# Patient Record
Sex: FEMALE | Race: WHITE | Employment: UNEMPLOYED | URBAN - METROPOLITAN AREA
[De-identification: names, ages, dates, MRNs, and addresses within clinical notes are randomized per-mention and may not be internally consistent; named-entity substitution may affect disease eponyms.]

---

## 2021-10-06 ENCOUNTER — APPOINTMENT (OUTPATIENT)
Dept: CT IMAGING | Age: 21
End: 2021-10-06
Payer: COMMERCIAL

## 2021-10-06 ENCOUNTER — HOSPITAL ENCOUNTER (EMERGENCY)
Age: 21
Discharge: HOME | End: 2021-10-06
Attending: STUDENT IN AN ORGANIZED HEALTH CARE EDUCATION/TRAINING PROGRAM
Payer: COMMERCIAL

## 2021-10-06 VITALS
DIASTOLIC BLOOD PRESSURE: 81 MMHG | OXYGEN SATURATION: 98 % | SYSTOLIC BLOOD PRESSURE: 128 MMHG | HEART RATE: 99 BPM | TEMPERATURE: 98.1 F | WEIGHT: 110 LBS | HEIGHT: 64 IN | RESPIRATION RATE: 18 BRPM | BODY MASS INDEX: 18.78 KG/M2

## 2021-10-06 DIAGNOSIS — N12 PYELONEPHRITIS: Primary | ICD-10-CM

## 2021-10-06 DIAGNOSIS — B96.89 BACTERIAL VAGINOSIS: ICD-10-CM

## 2021-10-06 DIAGNOSIS — N76.0 BACTERIAL VAGINOSIS: ICD-10-CM

## 2021-10-06 LAB
BACTERIA: ABNORMAL /HPF
BILIRUBIN URINE: NEGATIVE MG/DL
BLOOD, URINE: ABNORMAL
CLARITY: ABNORMAL
COLOR: YELLOW
GLUCOSE, URINE: NEGATIVE MG/DL
INTERPRETATION: NORMAL
KETONES, URINE: NEGATIVE MG/DL
LEUKOCYTE ESTERASE, URINE: ABNORMAL
Lab: ABNORMAL
MUCUS: ABNORMAL HPF
NITRITE URINE, QUANTITATIVE: NEGATIVE
PH, URINE: 6 (ref 5–8)
PREGNANCY, URINE: NEGATIVE
PROTEIN UA: 30 MG/DL
RBC URINE: 46 /HPF (ref 0–6)
SPECIFIC GRAVITY UA: 1.01 (ref 1–1.03)
SPECIFIC GRAVITY, URINE: 1.01 (ref 1–1.03)
SPECIMEN: ABNORMAL
SQUAMOUS EPITHELIAL: 5 /HPF
TRICHOMONAS: ABNORMAL /HPF
UROBILINOGEN, URINE: NEGATIVE MG/DL (ref 0.2–1)
WBC UA: 97 /HPF (ref 0–5)
WET PREP: ABNORMAL

## 2021-10-06 PROCEDURE — 6360000002 HC RX W HCPCS: Performed by: STUDENT IN AN ORGANIZED HEALTH CARE EDUCATION/TRAINING PROGRAM

## 2021-10-06 PROCEDURE — 87491 CHLMYD TRACH DNA AMP PROBE: CPT

## 2021-10-06 PROCEDURE — 96372 THER/PROPH/DIAG INJ SC/IM: CPT

## 2021-10-06 PROCEDURE — 81025 URINE PREGNANCY TEST: CPT

## 2021-10-06 PROCEDURE — 87086 URINE CULTURE/COLONY COUNT: CPT

## 2021-10-06 PROCEDURE — 87077 CULTURE AEROBIC IDENTIFY: CPT

## 2021-10-06 PROCEDURE — 87210 SMEAR WET MOUNT SALINE/INK: CPT

## 2021-10-06 PROCEDURE — 81001 URINALYSIS AUTO W/SCOPE: CPT

## 2021-10-06 PROCEDURE — 87591 N.GONORRHOEAE DNA AMP PROB: CPT

## 2021-10-06 PROCEDURE — 99285 EMERGENCY DEPT VISIT HI MDM: CPT

## 2021-10-06 PROCEDURE — 74176 CT ABD & PELVIS W/O CONTRAST: CPT

## 2021-10-06 RX ORDER — CEPHALEXIN 500 MG/1
500 CAPSULE ORAL 4 TIMES DAILY
Qty: 56 CAPSULE | Refills: 0 | Status: SHIPPED | OUTPATIENT
Start: 2021-10-06 | End: 2021-10-20

## 2021-10-06 RX ORDER — METRONIDAZOLE 500 MG/1
500 TABLET ORAL 2 TIMES DAILY
Qty: 14 TABLET | Refills: 0 | Status: SHIPPED | OUTPATIENT
Start: 2021-10-06 | End: 2021-10-13

## 2021-10-06 RX ORDER — KETOROLAC TROMETHAMINE 30 MG/ML
30 INJECTION, SOLUTION INTRAMUSCULAR; INTRAVENOUS ONCE
Status: COMPLETED | OUTPATIENT
Start: 2021-10-06 | End: 2021-10-06

## 2021-10-06 RX ADMIN — KETOROLAC TROMETHAMINE 30 MG: 30 INJECTION, SOLUTION INTRAMUSCULAR; INTRAVENOUS at 12:47

## 2021-10-06 ASSESSMENT — PAIN SCALES - GENERAL
PAINLEVEL_OUTOF10: 7
PAINLEVEL_OUTOF10: 7

## 2021-10-06 ASSESSMENT — PAIN DESCRIPTION - DESCRIPTORS: DESCRIPTORS: CONSTANT

## 2021-10-06 ASSESSMENT — PAIN DESCRIPTION - ORIENTATION: ORIENTATION: RIGHT;LEFT

## 2021-10-06 ASSESSMENT — PAIN DESCRIPTION - PAIN TYPE: TYPE: ACUTE PAIN

## 2021-10-06 ASSESSMENT — PAIN DESCRIPTION - LOCATION: LOCATION: FLANK

## 2021-10-06 NOTE — ED PROVIDER NOTES
EMERGENCY DEPARTMENT ENCOUNTER      CHIEF COMPLAINT    Chief Complaint   Patient presents with    Flank Pain    Hematuria    Vaginal Discharge       HPI    Noemí Dias is a 22 y.o. female with history significant for no past medical history who presents with flank pain hematuria dysuria vaginal discharge. His big concern is for the last few days she has been having right-sided flank pain also some mild left-sided discomfort with associated hematuria dysuria patient also noted some fishy odor with some scant clear vaginal discharge but denies any pelvic pain denies any anterior abdominal pain denies any nausea vomiting tolerating p.o. well no pain with sexual intercourse. Denies any fever denies any history of stone. REVIEW OF SYSTEMS    Constitutional: Denies chills, fatigue, unexpected weight loss or fever. HENT: Denies sore throat or rhinorrhea. Eyes: Denies vision changes. Respiratory: Denies shortness of breath or cough. Cardiovascular: Denies chest pain, leg swelling or palpitations. Gastrointestinal: Denies abdominal pain, diarrhea, nausea and vomiting. +flank pain  Genitourinary: dysuria or hematuria. +vaginal discharge  Skin: Denies rashes or wounds. MSK: Denies joint pain. Neurologic: Denies headache, lightheadedness, numbness, or weakness. Hematologic/lymphatic: Denies unexpected weight loss, night sweats  Endocrine: No polyuria, polydipsia, or polyphagia      Pertinent positives and negatives are delineated in HPI and ROS above, all other systems are reviewed and are negative    PAST MEDICAL HISTORY    History reviewed. No pertinent past medical history. Medical history reviewed and no pertinent past medical history other than the ones mentioned above    SURGICAL HISTORY    History reviewed. No pertinent surgical history.   Surgical history reviewed and no pertinent surgical history other than the ones mentioned above    CURRENT MEDICATIONS      Medication is 18.88 kg/m²   I have reviewed the triage vital signs. Constitutional: Well nourished, well developed, appears stated age  Eyes: PERRL, no conjunctival injection  HENT: NCAT, Neck supple without meningismus   CV: RRR, Warm, no edema  RESP: Normal RR, no increased respiratory efforts  GI: soft, non-distended, non tender, R CVA tenderness  MSK: No gross deformities  Skin: Warm, dry. No rashes  Neuro: Alert, CNs II-XII grossly intact. Moving all 4 extremities  Psych: Appropriate mood and affect.     Labs:   Labs Reviewed   WET PREP, GENITAL - Abnormal; Notable for the following components:       Result Value    Wet Prep NO TRICHOMONAS OR YEAST NOTED ON DIRECT WET PREP (*)     All other components within normal limits    Narrative:     SETUP DATE/TIME:     URINE RT REFLEX TO CULTURE - Abnormal; Notable for the following components:    Clarity, UA SLIGHTLY CLOUDY (*)     Blood, Urine LARGE (*)     Protein, UA 30 (*)     Leukocyte Esterase, Urine LARGE (*)     RBC, UA 46 (*)     WBC, UA 97 (*)     Bacteria, UA RARE (*)     Mucus, UA FEW (*)     All other components within normal limits   C.TRACHOMATIS N.GONORRHOEAE DNA   CULTURE, URINE   PREGNANCY, URINE       Radiology:  CT ABDOMEN PELVIS WO CONTRAST Additional Contrast? None    (Results Pending)       I directly reviewed the images and radiology interpretation      ED COURSE  Assessment & Medical Decision Making:  Pablito Mckeon is a 22 y.o. female who presents with dysuria hematuria flank pain, patient does significant CVA tenderness with the hematuria cannot rule out stone at this point patient does have a UTI and pyelonephritis on exam a urinalysis, we will obtain a CT Noncon to rule out stone if patient does not have a stone she is already tolerating p.o. and she is not pregnant and she can likely be discharged to treat pyelonephritis, patient does have some mild vaginal discharge but with no pain and no tenderness on transabdominal exam, after discussion with patient and we opted to skip the pelvic exam, patient self swab for gonorrhea chlamydia and wet prep, will treat patient for BV empirically. ED Course as of Oct 06 2009   Wed Oct 06, 2021   1536 No stones, pain is improved, will discharge for treatment for pyelonephritis and BV.    [YQ]      ED Course User Index  [YQ] Danelle Martinez MD        DDx includes but not limited to:  MSK, nephrolithiasis, pyelonephritis, infected stone, perinephritis abscess, AAA, dissection, pancreatitis      Workup includes but not limited to: Labs CT UA    Treatment includes but not limited to: Toradol    Critical care time: NA    Impression:   Pyelonephritis    Disposition: DC    This note dictated using Dragon medical voice recognition software. Attempts at proofreading were made, but errors may occasionally still occur.            Danelle Martinez MD  10/06/21 1453       Danelle Martinez MD  10/06/21 2009

## 2021-10-08 LAB
CULTURE: ABNORMAL
CULTURE: ABNORMAL
Lab: ABNORMAL
SPECIMEN: ABNORMAL

## 2021-10-09 LAB
CHLAMYDIA TRACHOMATIS AMPLIFIED DET: NEGATIVE
N GONORRHOEAE AMPLIFIED DET: NEGATIVE

## 2021-11-04 ENCOUNTER — HOSPITAL ENCOUNTER (EMERGENCY)
Age: 21
Discharge: PSYCHIATRIC OR SUBSTANCE USE FACILITY | End: 2021-11-05
Attending: EMERGENCY MEDICINE
Payer: COMMERCIAL

## 2021-11-04 DIAGNOSIS — T39.1X4A ACETAMINOPHEN OVERDOSE OF UNDETERMINED INTENT, INITIAL ENCOUNTER: Primary | ICD-10-CM

## 2021-11-04 DIAGNOSIS — R45.851 SUICIDAL IDEATION: ICD-10-CM

## 2021-11-04 LAB
ACETAMINOPHEN LEVEL: 8 UG/ML (ref 15–30)
ALBUMIN SERPL-MCNC: 5.1 GM/DL (ref 3.4–5)
ALCOHOL SCREEN SERUM: <0.01 %WT/VOL
ALP BLD-CCNC: 75 IU/L (ref 40–128)
ALT SERPL-CCNC: 68 U/L (ref 10–40)
AMPHETAMINES: NEGATIVE
ANION GAP SERPL CALCULATED.3IONS-SCNC: 11 MMOL/L (ref 4–16)
AST SERPL-CCNC: 33 IU/L (ref 15–37)
BARBITURATE SCREEN URINE: NEGATIVE
BASOPHILS ABSOLUTE: 0.1 K/CU MM
BASOPHILS RELATIVE PERCENT: 0.7 % (ref 0–1)
BENZODIAZEPINE SCREEN, URINE: NEGATIVE
BILIRUB SERPL-MCNC: 0.3 MG/DL (ref 0–1)
BUN BLDV-MCNC: 15 MG/DL (ref 6–23)
CALCIUM SERPL-MCNC: 10.4 MG/DL (ref 8.3–10.6)
CANNABINOID SCREEN URINE: NEGATIVE
CHLORIDE BLD-SCNC: 101 MMOL/L (ref 99–110)
CO2: 25 MMOL/L (ref 21–32)
COCAINE METABOLITE: NEGATIVE
CREAT SERPL-MCNC: 0.7 MG/DL (ref 0.6–1.1)
DIFFERENTIAL TYPE: ABNORMAL
DOSE AMOUNT: ABNORMAL
DOSE AMOUNT: ABNORMAL
DOSE TIME: ABNORMAL
DOSE TIME: ABNORMAL
EOSINOPHILS ABSOLUTE: 0.1 K/CU MM
EOSINOPHILS RELATIVE PERCENT: 0.9 % (ref 0–3)
GFR AFRICAN AMERICAN: >60 ML/MIN/1.73M2
GFR NON-AFRICAN AMERICAN: >60 ML/MIN/1.73M2
GLUCOSE BLD-MCNC: 105 MG/DL (ref 70–99)
HCG QUALITATIVE: NEGATIVE
HCT VFR BLD CALC: 42.6 % (ref 37–47)
HEMOGLOBIN: 13.2 GM/DL (ref 12.5–16)
IMMATURE NEUTROPHIL %: 0.2 % (ref 0–0.43)
LYMPHOCYTES ABSOLUTE: 1.6 K/CU MM
LYMPHOCYTES RELATIVE PERCENT: 19.1 % (ref 24–44)
MCH RBC QN AUTO: 28.2 PG (ref 27–31)
MCHC RBC AUTO-ENTMCNC: 31 % (ref 32–36)
MCV RBC AUTO: 91 FL (ref 78–100)
MONOCYTES ABSOLUTE: 0.6 K/CU MM
MONOCYTES RELATIVE PERCENT: 7.8 % (ref 0–4)
NUCLEATED RBC %: 0 %
OPIATES, URINE: NEGATIVE
OXYCODONE: NEGATIVE
PDW BLD-RTO: 14.6 % (ref 11.7–14.9)
PHENCYCLIDINE, URINE: NEGATIVE
PLATELET # BLD: 230 K/CU MM (ref 140–440)
PMV BLD AUTO: 10.8 FL (ref 7.5–11.1)
POTASSIUM SERPL-SCNC: 4.3 MMOL/L (ref 3.5–5.1)
RBC # BLD: 4.68 M/CU MM (ref 4.2–5.4)
SALICYLATE LEVEL: <0.3 MG/DL (ref 15–30)
SEGMENTED NEUTROPHILS ABSOLUTE COUNT: 5.8 K/CU MM
SEGMENTED NEUTROPHILS RELATIVE PERCENT: 71.3 % (ref 36–66)
SODIUM BLD-SCNC: 137 MMOL/L (ref 135–145)
TOTAL IMMATURE NEUTOROPHIL: 0.02 K/CU MM
TOTAL NUCLEATED RBC: 0 K/CU MM
TOTAL PROTEIN: 8 GM/DL (ref 6.4–8.2)
WBC # BLD: 8.1 K/CU MM (ref 4–10.5)

## 2021-11-04 PROCEDURE — 99285 EMERGENCY DEPT VISIT HI MDM: CPT

## 2021-11-04 PROCEDURE — G0480 DRUG TEST DEF 1-7 CLASSES: HCPCS

## 2021-11-04 PROCEDURE — 80053 COMPREHEN METABOLIC PANEL: CPT

## 2021-11-04 PROCEDURE — 84703 CHORIONIC GONADOTROPIN ASSAY: CPT

## 2021-11-04 PROCEDURE — 85025 COMPLETE CBC W/AUTO DIFF WBC: CPT

## 2021-11-04 PROCEDURE — 93005 ELECTROCARDIOGRAM TRACING: CPT | Performed by: PHYSICIAN ASSISTANT

## 2021-11-04 PROCEDURE — 87635 SARS-COV-2 COVID-19 AMP PRB: CPT

## 2021-11-04 PROCEDURE — 99291 CRITICAL CARE FIRST HOUR: CPT

## 2021-11-04 PROCEDURE — 6370000000 HC RX 637 (ALT 250 FOR IP): Performed by: PHYSICIAN ASSISTANT

## 2021-11-04 PROCEDURE — 80307 DRUG TEST PRSMV CHEM ANLYZR: CPT

## 2021-11-04 RX ADMIN — POISON ADSORBENT 50 G: 50 SUSPENSION ORAL at 22:05

## 2021-11-05 VITALS
TEMPERATURE: 97.4 F | RESPIRATION RATE: 16 BRPM | BODY MASS INDEX: 18.33 KG/M2 | SYSTOLIC BLOOD PRESSURE: 103 MMHG | DIASTOLIC BLOOD PRESSURE: 65 MMHG | WEIGHT: 110 LBS | OXYGEN SATURATION: 98 % | HEIGHT: 65 IN | HEART RATE: 76 BPM

## 2021-11-05 LAB
ACETAMINOPHEN LEVEL: <5 UG/ML (ref 15–30)
DOSE AMOUNT: ABNORMAL
DOSE TIME: ABNORMAL
EKG ATRIAL RATE: 103 BPM
EKG DIAGNOSIS: NORMAL
EKG P AXIS: 79 DEGREES
EKG P-R INTERVAL: 146 MS
EKG Q-T INTERVAL: 340 MS
EKG QRS DURATION: 70 MS
EKG QTC CALCULATION (BAZETT): 445 MS
EKG R AXIS: 64 DEGREES
EKG T AXIS: 54 DEGREES
EKG VENTRICULAR RATE: 103 BPM
SARS-COV-2, NAAT: NOT DETECTED
SOURCE: NORMAL

## 2021-11-05 PROCEDURE — 93010 ELECTROCARDIOGRAM REPORT: CPT | Performed by: INTERNAL MEDICINE

## 2021-11-05 PROCEDURE — G0480 DRUG TEST DEF 1-7 CLASSES: HCPCS

## 2021-11-05 NOTE — ED PROVIDER NOTES
I independently examined and evaluated Karen Artist. In brief their history revealed patient reports a purposeful Tylenol overdose in an attempt to get high. Patient reports taking 12 500 mg tablets of Tylenol 30 minutes prior to arrival.  Patient does currently feel lightheaded but otherwise is asymptomatic. Their focused exam revealed the patient is afebrile and hemodynamically stable on room air. The patient appears age appropriate, appears well-hydrated, well-nourished. Appears overall nontoxic texting on cell phone. Mucous membranes are moist. Speech is clear. Breathing is unlabored. Speaking clearly in full sentences. Skin is dry. Mental status is normal. The patient moves all extremities and is without facial droop. Marshall thoughts. Good eye contact. No current homicidal ideation or suicidal ideation. 12 lead EKG per my interpretation:  Sinus Tachycardia at 103  Axis is   Normal  QTc is  within an acceptable range  There is no specific T wave changes appreciated. There is no specific ST wave changes appreciated. No STEMI    Prior EKG to compare with was NOT available. ED course: Pt presents as above. Emergent conditions considered. Presentation prompted initial labs, EKG and imaging. Patient is pink slipped given the ingestion as it is a potentially lethal ingestion without treatment despite patient only saying this was an attempt to get high. Patient will need further medical clearance. Patient did become quite verbally abusive on hearing that she will need to stay and is pink slipped. Patient called myself a \"fucker\" and \"zachery little\". Patient with multiple vulgarities at this time when informed of the plan to further medically treat her life threatening overdose. Encouraged patient to monitor her language as this is a healing/therapeutic environment. EKG is with a sinus tachycardia as above. QTC is within normal limits.   Patient is placed in mental health precautions with sitter. Poison control was contacted and they are recommending activated charcoal given the proximity patient's ingestion as well as a repeat Tylenol level at the 4-hour point. Labs do demonstrate an elevated Tylenol as well as a mild transaminitis with elevated ALT. Patient's repeat acetaminophen level, medical clearance and final disposition pending on end of my shift. LORE Fisher will follow for the above. I did discuss care plans for repeat acetaminophen level with her as well as my concern that patient would benefit from inpatient stabilization for the above after medically cleared. Questions sought and answered with the patient. They voice understanding and agree with plan. Instructed to return for any worsening or worrisome concerns. All diagnostic, treatment, and disposition decisions were made by myself in conjunction with the Advanced Practice Provider. For all further details of the patient's emergency department visit, please see the Advanced Practice Provider's documentation.         Linh Tenorio MD  11/08/21 1137

## 2021-11-05 NOTE — ED NOTES
Patient remains on suicide precautions. 1:1  remains in the room at this time  . Room has been checked and safety measures remain in place. Pt resting eyes closed at this time.      Ronnette Mortimer, RN  11/05/21 3498

## 2021-11-05 NOTE — ED PROVIDER NOTES
Patient is endorsed to me by Fernando Huerta PA-C at 0300. In short, patient presented with SI and acetaminophen overdose. The patient was placed in suicide precautions, patient's clothing and belongings were removed, documented and stored in the emergency department. Patient was reported to me to be medically cleared. I have examined the patient and noted a normal exam and stable vitals. Mental health have evaluated the patientand haverecommended that the patient be transferred to a inpatient psychiatric facility. We are currently awaiting placement for the patient. Transferred to [facility].         Earl Garg MD  11/05/21 9153

## 2021-11-05 NOTE — ED NOTES
Nurse to nurse report called and given to Dominick Parish RN at [facility] - no ETA on transport at this time. Facility would like to be called back with an ETA for pt arrival when it is known. [phone] option 1.      Sylvania Moritz, RN  11/05/21 6071

## 2021-11-05 NOTE — ED NOTES
Called Enmanuel MORGAN @ [facility] and requested assistance with placement @ [facility].      Emerald Sheth RN  18/73/72 0684

## 2021-11-05 NOTE — ED NOTES
Pt very upset at this time about not being allowed to have her phone. This nurse sat with patient and verbal de-escalation effective. Phone numbers of family members obtained and noted in chart so that she can reach out to family. Warm blankets and apple juice provided at this time. Pt continues to want to stay in her clothes at this time. This nurse did check clothing pockets and around ankles in her boots with nothing being found at this time.       Reinaldo Scott RN  11/05/21 4067

## 2021-11-05 NOTE — ED NOTES
Bed: ED-15  Expected date:   Expected time:   Means of arrival:   Comments:   [address]  11/04/21 2104

## 2021-11-05 NOTE — ED NOTES
Report received from [facility] and care assumed at this time.       Felipe Nelson RN  11/05/21 0689

## 2021-11-05 NOTE — ED NOTES
Update given to [facility]. His case is closed due to Acetaminophen level is  Less than 5.0 UG/ml     .       Lyell Riedel, RN  11/05/21 1289 Antoni Rivera RN  11/05/21 7171
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
## 2021-11-05 NOTE — ED NOTES
Patient being transported to [facility], by [facility].  Belongings given to patient.        Cecile Xiao RN  11/05/21 3444

## 2021-11-05 NOTE — ED NOTES
[place] slip faxed to [facility] at this time. Awaiting number for nurse to nurse report and transport ETA.      Mohinder Decker RN  11/05/21 0963

## 2021-11-05 NOTE — ED PROVIDER NOTES
eMERGENCY dEPARTMENT eNCOUnter        [address] [facility]    Chief Complaint   Patient presents with    Ingestion       HPI    Hallie Mejia is a 22 y.o. female who presents with a Tylenol overdose. Patient admits to take #12 tablets of 500 mg Tylenol about 30 minutes prior to arrival.  She tells me that she took them in an effort to get high. She denies suicidal ideation on my exam.  She does admit to a history of depression and states she had a previous suicide attempt in her teens. She does not follow with mental health or take any medications. Denies homicidal ideation. Denies any other drug or ETOH use. Patient is angry she is being pink-slipped because she needs to go to work in the morning. REVIEW OF SYSTEMS    See HPI for further details. Review of systems otherwise negative. Constitutional:  Denies fever, chills. HENT:  Denies headache, dizziness, lightheadedness. Respiratory:  Denies any shortness of breath. Cardiovascular:  Denies chest pain, palpitations. GI:  Denies abdominal pain, nausea, vomiting, diarrhea. :  Denies dysuria. Musculoskeletal:  Denies edema, tenderness. Integument:  Denies rashes, lesions. Neurologic:  Denies altered mental status. Denies any numbness or tingling. Psychiatric:  + depression, ? suicidal ideation, denies homicidal ideation, denies hallucinations, denies drug abuse    PAST MEDICAL HISTORY    No past medical history on file. SURGICAL HISTORY    No past surgical history on file. CURRENT MEDICATIONS        ALLERGIES    No Known Allergies    FAMILY HISTORY    No family history on file.     SOCIAL HISTORY    Social History     Socioeconomic History    Marital status: Single     Spouse name: Not on file    Number of children: Not on file    Years of education: Not on file    Highest education level: Not on file   Occupational History    Not on file   Tobacco Use    Smoking status: Never Smoker   Substance and Sexual Activity    Alcohol use: Never    Drug use: Not Currently     Types: Marijuana Dunia Anthony)    Sexual activity: Not on file   Other Topics Concern    Not on file   Social History Narrative    Not on file     Social Determinants of Health     Financial Resource Strain:     Difficulty of Paying Living Expenses:    Food Insecurity:     Worried About Running Out of Food in the Last Year:     [address] in the Last Year:    Transportation Needs:     Lack of Transportation (Medical):  Lack of Transportation (Non-Medical):    Physical Activity:     Days of Exercise per Week:     Minutes of Exercise per Session:    Stress:     Feeling of Stress :    Social Connections:     Frequency of Communication with Friends and Family:     Frequency of Social Gatherings with Friends and Family:     Attends [employer] Services:     Active Member of Clubs or Organizations:     Attends Club or Organization Meetings:     Marital Status:    Intimate Partner Violence:     Fear of Current or Ex-Partner:     Emotionally Abused:     Physically Abused:     Sexually Abused:        PHYSICAL EXAM    VITAL SIGNS: /70   Pulse 87   Temp 96 °F (35.6 °C)   Resp 13   Ht 5' 5\" (1.651 m)   Wt 110 lb (49.9 kg)   SpO2 97%   BMI 18.30 kg/m²   Constitutional:  Well developed, well nourished, no acute distress, non-toxic appearance   Eyes:  PERRL, EOMI. Conjunctiva normal.  HENT:  NC/AT. Respiratory:  Lungs CTAB. Cardiovascular:  RRR. Musculoskeletal:  No acute deformities. Integument:  Well hydrated. Neurologic:  Alert and oriented. Psychiatric:  Agitated but otherwise normal affect.      RADIOLOGY/PROCEDURES    Labs Reviewed   SALICYLATE LEVEL - Abnormal; Notable for the following components:       Result Value    Salicylate Lvl <8.9 (*)     All other components within normal limits   ACETAMINOPHEN LEVEL - Abnormal; Notable for the following components:    Acetaminophen Level 8.0 (*)     All other components within normal limits   CBC WITH AUTO DIFFERENTIAL - Abnormal; Notable for the following components:    MCHC 31.0 (*)     Segs Relative 71.3 (*)     Lymphocytes % 19.1 (*)     Monocytes % 7.8 (*)     All other components within normal limits   COMPREHENSIVE METABOLIC PANEL W/ REFLEX TO MG FOR LOW K - Abnormal; Notable for the following components:    Glucose 105 (*)     Albumin 5.1 (*)     ALT 68 (*)     All other components within normal limits   ACETAMINOPHEN LEVEL - Abnormal; Notable for the following components:    Acetaminophen Level <5.0 (*)     All other components within normal limits   COVID-19, RAPID   ETHANOL   URINE DRUG SCREEN   HCG, SERUM, QUALITATIVE       ED COURSE & MEDICAL DECISION MAKING    Pertinent Labs & Imaging studies reviewed. (See chart for details)  -  Patient seen and evaluated in the emergency department. -  Triage and nursing notes reviewed and incorporated. -  Old chart records reviewed and incorporated. -  Patient case discussed with attending physician, Dr. Arturo Cabrera. They saw and examined patient. -  Differential diagnosis includes: depression, suicidal, behavior disorder, schizophrenia, schizoaffective disorder, manic, dementia, delirium, alcohol intoxication, drug toxicity, and others  -  Work-up included:  see above  -  I called Poison Control upon patient's arrival to the department. Spoke with Sergei Wilkinson, pharmacist, who will follow patient case. She does recommend activated charcoal at this time and will call back for 4 hour Tylenol level to determine if patient requires NAC.    -  Patient was agreeable to and was administered activated charcoal.  Initial Tylenol level detectable at 8.0 but undetectable on repeat. Medically cleared and consult was placed to Mental Health. Patient was assessed by Corinne Hermosillo, who recommends placement. Acceptance pending at the end of my shift. Signed out to Dr. Flavio Latham, please see his note for further details.     CRITICAL CARE NOTE:  There was a high probability of clinically significant life-threatening deterioration of the patient's condition requiring my urgent intervention due to Tylenol overdose. Telemetry monitoring, review of labs, consult Poison Control, administration of activated charcoal, reassessments was performed to address this. Total critical care time is at least  30 minutes. This includes vital sign monitoring, pulse oximetry monitoring, telemetry monitoring, clinical response to the IV medications, reviewing the nursing notes, consultation time, dictation/documentation time, and interpretation of the lab work. This time excludes time spent performing procedures and separately billable procedures and family discussion time. In light of current events, I did utilize appropriate PPE (including N95 and surgical face mask, safety glasses, and gloves, as recommended by the health facility/national standard best practice, during my bedside interactions with the patient. FINAL IMPRESSION    1. Acetaminophen overdose of undetermined intent, initial encounter    2.  Suicidal ideation              Gladys Casanova, [place]  11/05/21 8256

## 2021-11-05 NOTE — ED NOTES
Phone number for pt:  Boyfriend [phone]- [phone]  Demarcus Alex [phone]     Jatin Irving RN  11/05/21 9962       Jatin Irving RN  11/05/21 0027

## 2021-11-05 NOTE — ED NOTES
Patient remains on suicide precautions. 1:1  remains in the room at this time    . Room has been checked and safety measures remain in place.   Brain Alvarez RN  11/05/21 8281

## 2023-06-22 ENCOUNTER — HOSPITAL ENCOUNTER (EMERGENCY)
Age: 23
Discharge: HOME | End: 2023-06-23
Attending: EMERGENCY MEDICINE
Payer: OTHER MISCELLANEOUS

## 2023-06-22 VITALS
SYSTOLIC BLOOD PRESSURE: 121 MMHG | HEART RATE: 94 BPM | HEIGHT: 64 IN | DIASTOLIC BLOOD PRESSURE: 75 MMHG | TEMPERATURE: 98.1 F | OXYGEN SATURATION: 99 % | WEIGHT: 104 LBS | BODY MASS INDEX: 17.75 KG/M2 | RESPIRATION RATE: 16 BRPM

## 2023-06-22 DIAGNOSIS — S16.1XXA ACUTE STRAIN OF NECK MUSCLE, INITIAL ENCOUNTER: ICD-10-CM
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
## 2023-06-22 PROCEDURE — 6370000000 HC RX 637 (ALT 250 FOR IP): Performed by: EMERGENCY MEDICINE

## 2023-06-22 PROCEDURE — 99284 EMERGENCY DEPT VISIT MOD MDM: CPT

## 2023-06-22 RX ORDER — ACETAMINOPHEN 500 MG
1000 TABLET ORAL ONCE
Status: COMPLETED | OUTPATIENT
Start: 2023-06-23 | End: 2023-06-22

## 2023-06-22 RX ORDER — CYCLOBENZAPRINE HCL 10 MG
10 TABLET ORAL ONCE
Status: COMPLETED | OUTPATIENT
Start: 2023-06-23 | End: 2023-06-22

## 2023-06-22 RX ADMIN — ACETAMINOPHEN 1000 MG: 500 TABLET ORAL at 23:55

## 2023-06-22 RX ADMIN — CYCLOBENZAPRINE 10 MG: 10 TABLET, FILM COATED ORAL at 23:55

## 2023-06-22 ASSESSMENT — PAIN DESCRIPTION - DESCRIPTORS
DESCRIPTORS: BURNING
DESCRIPTORS: BURNING

## 2023-06-22 ASSESSMENT — PAIN DESCRIPTION - LOCATION
LOCATION: BACK
LOCATION: BACK

## 2023-06-22 ASSESSMENT — PAIN DESCRIPTION - ORIENTATION: ORIENTATION: MID

## 2023-06-22 ASSESSMENT — PAIN SCALES - GENERAL
PAINLEVEL_OUTOF10: 6
PAINLEVEL_OUTOF10: 6

## 2023-06-22 ASSESSMENT — LIFESTYLE VARIABLES
HOW MANY STANDARD DRINKS CONTAINING ALCOHOL DO YOU HAVE ON A TYPICAL DAY: PATIENT DOES NOT DRINK
HOW OFTEN DO YOU HAVE A DRINK CONTAINING ALCOHOL: NEVER

## 2023-06-22 ASSESSMENT — PAIN - FUNCTIONAL ASSESSMENT: PAIN_FUNCTIONAL_ASSESSMENT: 0-10

## 2023-06-22 ASSESSMENT — PAIN DESCRIPTION - PAIN TYPE: TYPE: ACUTE PAIN

## 2023-06-23 ENCOUNTER — APPOINTMENT (OUTPATIENT)
Dept: CT IMAGING | Age: 23
End: 2023-06-23
Payer: OTHER MISCELLANEOUS

## 2023-06-23 PROCEDURE — 72125 CT NECK SPINE W/O DYE: CPT

## 2023-06-23 PROCEDURE — 72128 CT CHEST SPINE W/O DYE: CPT

## 2023-06-23 ASSESSMENT — PAIN DESCRIPTION - LOCATION: LOCATION: NECK

## 2023-06-23 ASSESSMENT — PAIN SCALES - GENERAL: PAINLEVEL_OUTOF10: 5

## 2023-06-23 ASSESSMENT — PAIN DESCRIPTION - DESCRIPTORS: DESCRIPTORS: BURNING

## 2023-06-23 ASSESSMENT — PAIN DESCRIPTION - ORIENTATION: ORIENTATION: MID

## 2023-06-23 NOTE — ED NOTES
Per verbal order from Frank Judd MD., pt allowed to walk to bathroom. This RN assisted pt to restroom. Steady gate, denies dizziness/pain/light headedness.       Jack Rose RN  06/23/23 6686

## 2023-06-23 NOTE — ED PROVIDER NOTES
facility-administered medications for this encounter. No current outpatient medications on file. No Known Allergies    Nursing Notes Reviewed    Physical Exam:  ED Triage Vitals   Enc Vitals Group      BP 06/22/23 2347 121/75      Pulse 06/22/23 2346 91      Respirations 06/22/23 2347 16      Temp 06/22/23 2346 98.1 °F (36.7 °C)      Temp Source 06/22/23 2346 Oral      SpO2 06/22/23 2347 99 %      Weight - Scale 06/22/23 2347 104 lb (47.2 kg)      Height 06/22/23 2347 5' 4\" (1.626 m)      Head Circumference --       Peak Flow --       Pain Score --       Pain Loc --       Pain Edu? --       Excl. in [address]? --      General appearance:  No acute distress. Head: Normocephalic, atraumatic  Face: No bony deformity, midface stable  Skin:  Warm. Dry. No acute wounds  Eye:  Extraocular movements intact. Pupils equal and reactive  Ears, nose, mouth and throat:  Oral mucosa moist  Neck:  Trachea midline. Extremity:  No swelling. Normal ROM. No tenderness to palpation x4 extremities   Back: No midline CTLS tenderness to palpation or step-offs. Patient does have paraspinal tenderness of the cervical and thoracic regions. C-collar in place  Heart:  Regular rate and rhythm  Respiratory:  Lungs clear to auscultation bilaterally. Respirations nonlabored. Chest: No tenderness to palpation. No ecchymosis or deformity. Abdominal:  Soft. Nontender. Non distended. Neurological:  Alert and oriented times 4.  5 out of 5 motor strength and sensation intact to light touch in all extremities    I have reviewed and interpreted all of the currently available lab results from this visit (if applicable):  No results found for this visit on 06/22/23.    Radiographs (if obtained):  [] The following radiograph was interpreted by myself in the absence of a radiologist:  [x] Radiologist's Report Reviewed:    Medical Decision Making and ED Course:    CC/HPI Summary, DDx, ED Course, and Reassessment: Patient arrives after a

## 2024-08-08 ENCOUNTER — APPOINTMENT (OUTPATIENT)
Dept: CT IMAGING | Age: 24
End: 2024-08-08

## 2024-08-08 ENCOUNTER — HOSPITAL ENCOUNTER (EMERGENCY)
Age: 24
Discharge: HOME | End: 2024-08-08
Attending: EMERGENCY MEDICINE

## 2024-08-08 VITALS
SYSTOLIC BLOOD PRESSURE: 102 MMHG | DIASTOLIC BLOOD PRESSURE: 69 MMHG | HEART RATE: 80 BPM | OXYGEN SATURATION: 100 % | BODY MASS INDEX: 18.27 KG/M2 | WEIGHT: 107 LBS | HEIGHT: 64 IN | RESPIRATION RATE: 19 BRPM | TEMPERATURE: 98.6 F

## 2024-08-08 DIAGNOSIS — R55 SYNCOPE AND COLLAPSE: Primary | ICD-10-CM

## 2024-08-08 DIAGNOSIS — N92.1 MENORRHAGIA WITH IRREGULAR CYCLE: ICD-10-CM

## 2024-08-08 LAB
ABO/RH: NORMAL
ALBUMIN SERPL-MCNC: 4.4 GM/DL (ref 3.4–5)
ALP BLD-CCNC: 42 IU/L (ref 40–128)
ALT SERPL-CCNC: 12 U/L (ref 10–40)
ANION GAP SERPL CALCULATED.3IONS-SCNC: 14 MMOL/L (ref 7–16)
ANTIBODY SCREEN: NEGATIVE
AST SERPL-CCNC: 15 IU/L (ref 15–37)
BASOPHILS RELATIVE PERCENT: 0.5 % (ref 0–1)
BILIRUB SERPL-MCNC: 0.5 MG/DL (ref 0–1)
BUN SERPL-MCNC: 15 MG/DL (ref 6–23)
CALCIUM SERPL-MCNC: 9.3 MG/DL (ref 8.3–10.6)
CHLORIDE BLD-SCNC: 103 MMOL/L (ref 99–110)
CO2: 22 MMOL/L (ref 21–32)
CREAT SERPL-MCNC: 0.6 MG/DL (ref 0.6–1.1)
DIFFERENTIAL TYPE: ABNORMAL
EOSINOPHILS ABSOLUTE: 0.1 K/CU MM
EOSINOPHILS RELATIVE PERCENT: 1.3 % (ref 0–3)
GFR, ESTIMATED: >90 ML/MIN/1.73M2
GLUCOSE BLD-MCNC: 104 MG/DL (ref 70–99)
GLUCOSE SERPL-MCNC: 111 MG/DL (ref 70–99)
HCT VFR BLD CALC: 39.7 % (ref 37–47)
HEMOGLOBIN: 13 GM/DL (ref 12.5–16)
IMMATURE NEUTROPHIL %: 0.3 % (ref 0–0.43)
INR BLD: 1.1 INDEX
LYMPHOCYTES ABSOLUTE: 1.4 K/CU MM
LYMPHOCYTES RELATIVE PERCENT: 22.8 % (ref 24–44)
MAGNESIUM: 1.7 MG/DL (ref 1.8–2.4)
MCH RBC QN AUTO: 29.8 PG (ref 27–31)
MCHC RBC AUTO-ENTMCNC: 32.7 % (ref 32–36)
MCV RBC AUTO: 91.1 FL (ref 78–100)
MONOCYTES ABSOLUTE: 0.5 K/CU MM
MONOCYTES RELATIVE PERCENT: 8.6 % (ref 0–4)
NEUTROPHILS ABSOLUTE: 4.1 K/CU MM
NEUTROPHILS RELATIVE PERCENT: 66.5 % (ref 36–66)
NUCLEATED RBC %: 0 %
PDW BLD-RTO: 13.5 % (ref 11.7–14.9)
PLATELET # BLD: 205 K/CU MM (ref 140–440)
PMV BLD AUTO: 10.8 FL (ref 7.5–11.1)
POTASSIUM SERPL-SCNC: 4 MMOL/L (ref 3.5–5.1)
PROTHROMBIN TIME: 14.7 SECONDS (ref 11.7–14.5)
RBC # BLD: 4.36 M/CU MM (ref 4.2–5.4)
SODIUM BLD-SCNC: 139 MMOL/L (ref 135–145)
TOTAL IMMATURE NEUTOROPHIL: 0.02 K/CU MM
TOTAL NUCLEATED RBC: 0 K/CU MM
TOTAL PROTEIN: 7.5 GM/DL (ref 6.4–8.2)
TROPONIN, HIGH SENSITIVITY: <6 NG/L (ref 0–14)
WBC # BLD: 6.1 K/CU MM (ref 4–10.5)

## 2024-08-08 PROCEDURE — 93005 ELECTROCARDIOGRAM TRACING: CPT | Performed by: EMERGENCY MEDICINE

## 2024-08-08 PROCEDURE — 82962 GLUCOSE BLOOD TEST: CPT

## 2024-08-08 PROCEDURE — 86901 BLOOD TYPING SEROLOGIC RH(D): CPT

## 2024-08-08 PROCEDURE — 84484 ASSAY OF TROPONIN QUANT: CPT

## 2024-08-08 PROCEDURE — 86850 RBC ANTIBODY SCREEN: CPT

## 2024-08-08 PROCEDURE — 70450 CT HEAD/BRAIN W/O DYE: CPT

## 2024-08-08 PROCEDURE — 83735 ASSAY OF MAGNESIUM: CPT

## 2024-08-08 PROCEDURE — 85610 PROTHROMBIN TIME: CPT

## 2024-08-08 PROCEDURE — 80053 COMPREHEN METABOLIC PANEL: CPT

## 2024-08-08 PROCEDURE — 99284 EMERGENCY DEPT VISIT MOD MDM: CPT

## 2024-08-08 PROCEDURE — 2580000003 HC RX 258: Performed by: EMERGENCY MEDICINE

## 2024-08-08 PROCEDURE — 6370000000 HC RX 637 (ALT 250 FOR IP): Performed by: EMERGENCY MEDICINE

## 2024-08-08 PROCEDURE — 85025 COMPLETE CBC W/AUTO DIFF WBC: CPT

## 2024-08-08 PROCEDURE — 86900 BLOOD TYPING SEROLOGIC ABO: CPT

## 2024-08-08 RX ORDER — IBUPROFEN 400 MG/1
600 TABLET ORAL EVERY 6 HOURS PRN
Qty: 30 TABLET | Refills: 0 | Status: SHIPPED | OUTPATIENT
Start: 2024-08-08 | End: 2024-08-13

## 2024-08-08 RX ORDER — MEDROXYPROGESTERONE ACETATE 10 MG/1
10 TABLET ORAL DAILY
Qty: 5 TABLET | Refills: 0 | Status: SHIPPED | OUTPATIENT
Start: 2024-08-08 | End: 2024-08-13

## 2024-08-08 RX ORDER — IBUPROFEN 400 MG/1
400 TABLET ORAL ONCE
Status: COMPLETED | OUTPATIENT
Start: 2024-08-08 | End: 2024-08-08

## 2024-08-08 RX ORDER — 0.9 % SODIUM CHLORIDE 0.9 %
1000 INTRAVENOUS SOLUTION INTRAVENOUS ONCE
Status: COMPLETED | OUTPATIENT
Start: 2024-08-08 | End: 2024-08-08

## 2024-08-08 RX ADMIN — IBUPROFEN 400 MG: 400 TABLET, FILM COATED ORAL at 09:55

## 2024-08-08 RX ADMIN — SODIUM CHLORIDE 1000 ML: 9 INJECTION, SOLUTION INTRAVENOUS at 09:11

## 2024-08-08 ASSESSMENT — PAIN DESCRIPTION - DESCRIPTORS
DESCRIPTORS: ACHING
DESCRIPTORS: OTHER (COMMENT)

## 2024-08-08 ASSESSMENT — PAIN DESCRIPTION - LOCATION
LOCATION: ABDOMEN
LOCATION: HEAD;ABDOMEN

## 2024-08-08 ASSESSMENT — PAIN SCALES - GENERAL
PAINLEVEL_OUTOF10: 6
PAINLEVEL_OUTOF10: 5

## 2024-08-08 ASSESSMENT — LIFESTYLE VARIABLES
HOW OFTEN DO YOU HAVE A DRINK CONTAINING ALCOHOL: MONTHLY OR LESS
HOW MANY STANDARD DRINKS CONTAINING ALCOHOL DO YOU HAVE ON A TYPICAL DAY: 1 OR 2

## 2024-08-08 ASSESSMENT — PAIN DESCRIPTION - ORIENTATION: ORIENTATION: LOWER

## 2024-08-08 NOTE — ED TRIAGE NOTES
Pt presents to ED via EMS for Loss of consciousness, Pt was at work and passed out. Per EMS pt was sating at 89% and was put on 6L on non re breather and sats came up to 99% then was titrated off o2. Pt states she has been on her period since July 10th. Pt reports she is having heavy bleeding, abdominal pain, and headache.

## 2024-08-08 NOTE — ED PROVIDER NOTES
Rate Last Admin    sodium chloride 0.9 % bolus 1,000 mL  1,000 mL IntraVENous Once Yuri Cruz .9 mL/hr at 08/08/24 0911 1,000 mL at 08/08/24 0911     Current Outpatient Medications   Medication Sig Dispense Refill    ibuprofen (ADVIL;MOTRIN) 400 MG tablet Take 1.5 tablets by mouth every 6 hours as needed for Pain 30 tablet 0    medroxyPROGESTERone (PROVERA) 10 MG tablet Take 1 tablet by mouth daily for 5 days 5 tablet 0     No Known Allergies    Nursing Notes Reviewed    Physical Exam:  Triage VS:    ED Triage Vitals [08/08/24 0849]   Enc Vitals Group      /76      Pulse (!) 106      Respirations 18      Temp 98.6 °F (37 °C)      Temp Source Oral      SpO2 99 %      Weight       Height       Head Circumference       Peak Flow       Pain Score       Pain Loc       Pain Edu?       Excl. in [facility]?        My pulse ox interpretation is - normal    General appearance: Awake and alert, no acute distress.   Skin:  Warm. Dry.  Perfusion normal  Eye:  Extraocular movements intact.     Ears, nose, mouth and throat:  Oral mucosa moist   Neck:  Trachea midline.   Extremity:  No swelling.  Normal ROM     Heart:  Regular rate and rhythm, normal S1 & S2, no extra heart sounds.    Perfusion:  intact  Respiratory:  Lungs clear to auscultation bilaterally.  Respirations nonlabored.     Abdominal:  Normal bowel sounds.  Soft.  Nontender.  Non distended.  Back:  No CVA tenderness to palpation     Neurological:  Alert and oriented times 3.  No focal neuro deficits.             Psychiatric:  Appropriate    I have reviewed and interpreted all of the currently available lab results from this visit (if applicable):  Results for orders placed or performed during the hospital encounter of 08/08/24   CBC with Auto Differential   Result Value Ref Range    WBC 6.1 4.0 - 10.5 K/CU MM    RBC 4.36 4.2 - 5.4 M/CU MM    Hemoglobin 13.0 12.5 - 16.0 GM/DL    Hematocrit 39.7 37 - 47 %    MCV 91.1 78 - 100 FL    MCH 29.8 27 - 31 PG    MCHC

## 2024-08-09 LAB
EKG ATRIAL RATE: 91 BPM
EKG DIAGNOSIS: NORMAL
EKG P AXIS: 40 DEGREES
EKG P-R INTERVAL: 132 MS
EKG Q-T INTERVAL: 344 MS
EKG QRS DURATION: 90 MS
EKG QTC CALCULATION (BAZETT): 423 MS
EKG R AXIS: 44 DEGREES
EKG T AXIS: 35 DEGREES
EKG VENTRICULAR RATE: 91 BPM

## 2024-08-09 PROCEDURE — 93010 ELECTROCARDIOGRAM REPORT: CPT | Performed by: INTERNAL MEDICINE

## 2024-08-12 ENCOUNTER — HOSPITAL ENCOUNTER (EMERGENCY)
Age: 24
Discharge: HOME | End: 2024-08-12
Attending: STUDENT IN AN ORGANIZED HEALTH CARE EDUCATION/TRAINING PROGRAM

## 2024-08-12 ENCOUNTER — APPOINTMENT (OUTPATIENT)
Dept: GENERAL RADIOLOGY | Age: 24
End: 2024-08-12

## 2024-08-12 VITALS
HEART RATE: 78 BPM | OXYGEN SATURATION: 100 % | TEMPERATURE: 98.8 F | DIASTOLIC BLOOD PRESSURE: 60 MMHG | RESPIRATION RATE: 23 BRPM | SYSTOLIC BLOOD PRESSURE: 100 MMHG

## 2024-08-12 DIAGNOSIS — R42 LIGHTHEADEDNESS: Primary | ICD-10-CM

## 2024-08-12 DIAGNOSIS — N93.9 ABNORMAL VAGINAL BLEEDING: ICD-10-CM

## 2024-08-12 DIAGNOSIS — R05.3 CHRONIC COUGH: ICD-10-CM

## 2024-08-12 LAB
ANION GAP SERPL CALCULATED.3IONS-SCNC: 9 MMOL/L (ref 7–16)
BASOPHILS ABSOLUTE: 0 K/CU MM
BASOPHILS RELATIVE PERCENT: 0.5 % (ref 0–1)
BUN SERPL-MCNC: 18 MG/DL (ref 6–23)
CALCIUM SERPL-MCNC: 8.7 MG/DL (ref 8.3–10.6)
CHLORIDE BLD-SCNC: 105 MMOL/L (ref 99–110)
CO2: 24 MMOL/L (ref 21–32)
CREAT SERPL-MCNC: 0.6 MG/DL (ref 0.6–1.1)
DIFFERENTIAL TYPE: ABNORMAL
EOSINOPHILS ABSOLUTE: 0.1 K/CU MM
EOSINOPHILS RELATIVE PERCENT: 1.7 % (ref 0–3)
GFR, ESTIMATED: >90 ML/MIN/1.73M2
GLUCOSE SERPL-MCNC: 93 MG/DL (ref 70–99)
GONADOTROPIN, CHORIONIC (HCG) QUANT: <1 UIU/ML
HCT VFR BLD CALC: 40.2 % (ref 37–47)
HEMOGLOBIN: 13.2 GM/DL (ref 12.5–16)
IMMATURE NEUTROPHIL %: 0.3 % (ref 0–0.43)
LYMPHOCYTES ABSOLUTE: 1.2 K/CU MM
LYMPHOCYTES RELATIVE PERCENT: 15.5 % (ref 24–44)
MCH RBC QN AUTO: 30.2 PG (ref 27–31)
MCHC RBC AUTO-ENTMCNC: 32.8 % (ref 32–36)
MCV RBC AUTO: 92 FL (ref 78–100)
MONOCYTES ABSOLUTE: 0.5 K/CU MM
MONOCYTES RELATIVE PERCENT: 7 % (ref 0–4)
NEUTROPHILS ABSOLUTE: 5.7 K/CU MM
NEUTROPHILS RELATIVE PERCENT: 75 % (ref 36–66)
NUCLEATED RBC %: 0 %
PDW BLD-RTO: 13.6 % (ref 11.7–14.9)
PLATELET # BLD: 193 K/CU MM (ref 140–440)
PMV BLD AUTO: 10.4 FL (ref 7.5–11.1)
POTASSIUM SERPL-SCNC: 4 MMOL/L (ref 3.5–5.1)
RBC # BLD: 4.37 M/CU MM (ref 4.2–5.4)
SODIUM BLD-SCNC: 138 MMOL/L (ref 135–145)
TOTAL IMMATURE NEUTOROPHIL: 0.02 K/CU MM
TOTAL NUCLEATED RBC: 0 K/CU MM
WBC # BLD: 7.6 K/CU MM (ref 4–10.5)

## 2024-08-12 PROCEDURE — 80048 BASIC METABOLIC PNL TOTAL CA: CPT

## 2024-08-12 PROCEDURE — 85025 COMPLETE CBC W/AUTO DIFF WBC: CPT

## 2024-08-12 PROCEDURE — 99285 EMERGENCY DEPT VISIT HI MDM: CPT

## 2024-08-12 PROCEDURE — 93005 ELECTROCARDIOGRAM TRACING: CPT | Performed by: STUDENT IN AN ORGANIZED HEALTH CARE EDUCATION/TRAINING PROGRAM

## 2024-08-12 PROCEDURE — 84702 CHORIONIC GONADOTROPIN TEST: CPT

## 2024-08-12 PROCEDURE — 71046 X-RAY EXAM CHEST 2 VIEWS: CPT

## 2024-08-12 ASSESSMENT — PAIN DESCRIPTION - DESCRIPTORS: DESCRIPTORS: CRAMPING

## 2024-08-12 ASSESSMENT — PAIN SCALES - GENERAL: PAINLEVEL_OUTOF10: 5

## 2024-08-12 ASSESSMENT — PAIN DESCRIPTION - LOCATION: LOCATION: ABDOMEN

## 2024-08-12 NOTE — ED PROVIDER NOTES
AUTOMATED DIFFERENTIAL     Neutrophils % 75.0 (H) 36 - 66 %    Lymphocytes % 15.5 (L) 24 - 44 %    Monocytes % 7.0 (H) 0 - 4 %    Eosinophils % 1.7 0 - 3 %    Basophils % 0.5 0 - 1 %    Neutrophils Absolute 5.7 K/CU MM    Lymphocytes Absolute 1.2 K/CU MM    Monocytes Absolute 0.5 K/CU MM    Eosinophils Absolute 0.1 K/CU MM    Basophils Absolute 0.0 K/CU MM    Nucleated RBC % 0.0 %    Total Nucleated RBC 0.0 K/CU MM    Total Immature Neutrophil 0.02 K/CU MM    Immature Neutrophil % 0.3 0 - 0.43 %   HCG, Quantitative, Serum   Result Value Ref Range    hCG Quant <1.0 uIU/ML   EKG 12 Lead (Syncope)   Result Value Ref Range    Ventricular Rate 70 BPM    Atrial Rate 70 BPM    P-R Interval 136 ms    QRS Duration 72 ms    Q-T Interval 374 ms    QTc Calculation (Bazett) 403 ms    P Axis 2 degrees    R Axis 47 degrees    T Axis 47 degrees    Diagnosis       Normal sinus rhythm  Normal ECG  When compared with ECG of 08-AUG-2024 08:44,  Nonspecific T wave abnormality now evident in Anterior leads        Radiographs (if obtained):  Radiologist's Report Reviewed:  XR CHEST (2 VW)    Result Date: 8/12/2024  Chest X-ray INDICATION: Chest pain COMPARISON:  None TECHNIQUE: PA and lateral views of the chest were obtained. FINDINGS: The lungs are clear. There are no infiltrates or effusions.  The heart size is normal.  The bony thorax is intact.      No acute findings in the chest Electronically signed by Britany Perez        Clinical Impression:  1. Lightheadedness    2. Abnormal vaginal bleeding    3. Chronic cough      Disposition referral (if applicable):  [facility] Emergency Department  [address] [address]  [phone]  Go today  If symptoms worsen    Disposition medications (if applicable):  New Prescriptions    No medications on file     ED Provider Disposition Time  DISPOSITION Discharge - Pending Orders Complete 08/12/2024 08:38:14 AM      Comment: Please

## 2024-08-12 NOTE — ED TRIAGE NOTES
Pt arrives via EMS for near syncopal episode. Pt was at work and \"felt lightheaded\". Pt has had vaginal bleeding x5 weeks. Pt was seen recently in  ED, continues to have symptoms of vertigo.

## 2024-08-14 LAB
EKG ATRIAL RATE: 70 BPM
EKG DIAGNOSIS: NORMAL
EKG P AXIS: 2 DEGREES
EKG P-R INTERVAL: 136 MS
EKG Q-T INTERVAL: 374 MS
EKG QRS DURATION: 72 MS
EKG QTC CALCULATION (BAZETT): 403 MS
EKG R AXIS: 47 DEGREES
EKG T AXIS: 47 DEGREES
EKG VENTRICULAR RATE: 70 BPM

## 2024-08-14 PROCEDURE — 93010 ELECTROCARDIOGRAM REPORT: CPT | Performed by: INTERNAL MEDICINE

## 2024-08-22 ENCOUNTER — HOSPITAL ENCOUNTER (OUTPATIENT)
Age: 24
Setting detail: SPECIMEN
Discharge: HOME | End: 2024-08-22

## 2024-08-22 ENCOUNTER — OFFICE VISIT (OUTPATIENT)
Dept: OBGYN | Age: 24
End: 2024-08-22

## 2024-08-22 VITALS
BODY MASS INDEX: 19.81 KG/M2 | HEART RATE: 84 BPM | DIASTOLIC BLOOD PRESSURE: 64 MMHG | HEIGHT: 64 IN | WEIGHT: 116 LBS | SYSTOLIC BLOOD PRESSURE: 102 MMHG

## 2024-08-22 DIAGNOSIS — Z01.419 WELL WOMAN EXAM WITH ROUTINE GYNECOLOGICAL EXAM: Primary | ICD-10-CM

## 2024-08-22 DIAGNOSIS — N92.1 MENORRHAGIA WITH IRREGULAR CYCLE: ICD-10-CM

## 2024-08-22 DIAGNOSIS — N94.6 DYSMENORRHEA: ICD-10-CM

## 2024-08-22 DIAGNOSIS — Z12.4 CERVICAL CANCER SCREENING: ICD-10-CM

## 2024-08-22 DIAGNOSIS — N94.10 DYSPAREUNIA IN FEMALE: ICD-10-CM

## 2024-08-22 DIAGNOSIS — N64.4 BREAST PAIN IN FEMALE: ICD-10-CM

## 2024-08-22 DIAGNOSIS — N63.22 MASS OF UPPER INNER QUADRANT OF LEFT BREAST: ICD-10-CM

## 2024-08-22 DIAGNOSIS — R42 DIZZINESS: ICD-10-CM

## 2024-08-22 LAB
BASOPHILS # BLD: 0 K/UL (ref 0–0.2)
BASOPHILS NFR BLD: 0.6 %
CONTROL: NORMAL
DEPRECATED RDW RBC AUTO: 13.7 % (ref 12.4–15.4)
EOSINOPHIL # BLD: 0.2 K/UL (ref 0–0.6)
EOSINOPHIL NFR BLD: 3 %
HCT VFR BLD AUTO: 39.2 % (ref 36–48)
HGB BLD-MCNC: 13.3 G/DL (ref 12–16)
LYMPHOCYTES # BLD: 1.8 K/UL (ref 1–5.1)
LYMPHOCYTES NFR BLD: 29.5 %
MCH RBC QN AUTO: 30.4 PG (ref 26–34)
MCHC RBC AUTO-ENTMCNC: 34 G/DL (ref 31–36)
MCV RBC AUTO: 89.5 FL (ref 80–100)
MONOCYTES # BLD: 0.6 K/UL (ref 0–1.3)
MONOCYTES NFR BLD: 9.5 %
NEUTROPHILS # BLD: 3.5 K/UL (ref 1.7–7.7)
NEUTROPHILS NFR BLD: 57.4 %
PLATELET # BLD AUTO: 208 K/UL (ref 135–450)
PMV BLD AUTO: 10.2 FL (ref 5–10.5)
PREGNANCY TEST URINE, POC: NORMAL
RBC # BLD AUTO: 4.37 M/UL (ref 4–5.2)
WBC # BLD AUTO: 6 K/UL (ref 4–11)

## 2024-08-22 PROCEDURE — 81025 URINE PREGNANCY TEST: CPT | Performed by: STUDENT IN AN ORGANIZED HEALTH CARE EDUCATION/TRAINING PROGRAM

## 2024-08-22 PROCEDURE — 88142 CYTOPATH C/V THIN LAYER: CPT

## 2024-08-22 PROCEDURE — 99385 PREV VISIT NEW AGE 18-39: CPT | Performed by: STUDENT IN AN ORGANIZED HEALTH CARE EDUCATION/TRAINING PROGRAM

## 2024-08-22 PROCEDURE — 36415 COLL VENOUS BLD VENIPUNCTURE: CPT | Performed by: STUDENT IN AN ORGANIZED HEALTH CARE EDUCATION/TRAINING PROGRAM

## 2024-08-22 RX ORDER — DROSPIRENONE 4 MG/1
1 TABLET, FILM COATED ORAL DAILY
Qty: 28 TABLET | Refills: 11 | Status: SHIPPED | OUTPATIENT
Start: 2024-08-22

## 2024-08-22 RX ORDER — MEDROXYPROGESTERONE ACETATE 150 MG/ML
150 INJECTION, SUSPENSION INTRAMUSCULAR
COMMUNITY

## 2024-08-22 SDOH — ECONOMIC STABILITY: INCOME INSECURITY: HOW HARD IS IT FOR YOU TO PAY FOR THE VERY BASICS LIKE FOOD, HOUSING, MEDICAL CARE, AND HEATING?: SOMEWHAT HARD

## 2024-08-22 SDOH — ECONOMIC STABILITY: FOOD INSECURITY: WITHIN THE PAST 12 MONTHS, YOU WORRIED THAT YOUR FOOD WOULD RUN OUT BEFORE YOU GOT MONEY TO BUY MORE.: NEVER TRUE

## 2024-08-22 SDOH — ECONOMIC STABILITY: FOOD INSECURITY: WITHIN THE PAST 12 MONTHS, THE FOOD YOU BOUGHT JUST DIDN'T LAST AND YOU DIDN'T HAVE MONEY TO GET MORE.: NEVER TRUE

## 2024-08-22 ASSESSMENT — PATIENT HEALTH QUESTIONNAIRE - PHQ9
SUM OF ALL RESPONSES TO PHQ9 QUESTIONS 1 & 2: 0
SUM OF ALL RESPONSES TO PHQ QUESTIONS 1-9: 0
SUM OF ALL RESPONSES TO PHQ QUESTIONS 1-9: 0
2. FEELING DOWN, DEPRESSED OR HOPELESS: NOT AT ALL
SUM OF ALL RESPONSES TO PHQ QUESTIONS 1-9: 0
1. LITTLE INTEREST OR PLEASURE IN DOING THINGS: NOT AT ALL
SUM OF ALL RESPONSES TO PHQ QUESTIONS 1-9: 0

## 2024-08-22 NOTE — PROGRESS NOTES
Department of Obstetrics and Gynecology  Well Woman Office Visit  Name:  Melida Vazquez   CSN: [id number]    : 2000   Age: 24 y.o.      Chief Complaint   Patient presents with    New Patient     Pt here for annual, is sexually active, irregular menses, LMP-2024, bc-dep[place]rs [place].     Irregular Menses     Pt c/o aub x 6 wks, light-heavy, no clots.     Breast Problem     Pt c/o bilateral breast lumps and pain x 1 yr.     Dyspareunia     Pt c/o dyspareunia x 4 yrs, during, crampy, radiates into abdomen and vaginal region.        SUBJECTIVE:  Melida Vazquez is a 24 y.o.  who presents for a routine well woman exam. Reports irregular cycles and cramping. States she has had 1 depo provera injection. Reports she gets lightheaded and has had to leave work due to this. She has tried IUD and did not like this method. She is concerned she has endometriosis.    GYN HX:    Menses:   Patient's last menstrual period was 2024.  Periods are irregular.  Menorrhagia, dysmenorrhea  Cervical cancer screening:  Last pap smear 3 years ago.  No history of abnormal pap smears.  Contraception:  Depo.   Infections:  No history of STDs or PID.  Sexual history:  Currently is sexually active.  Denies issues of sexual dysfunction.  Denies current or past issues with physical, emotional, or sexual abuse.  No family history of gyn malignancy.     Patient's medications, allergies, past medical, surgical, social and family histories were reviewed and updated as appropriate.    Past Medical History:   Diagnosis Date    Anxiety     Asthma     Depression     Dizziness     Dysmenorrhea     Dyspareunia in female     Irregular menses     Menorrhagia        No past surgical history on file.    Social History     Socioeconomic History    Marital status: Single     Spouse name: Not on file    Number of children: Not on file    Years of education: Not on file    Highest education level: Not on file

## 2024-10-23 ENCOUNTER — OFFICE VISIT (OUTPATIENT)
Age: 24
End: 2024-10-23

## 2024-10-23 VITALS
HEIGHT: 64 IN | SYSTOLIC BLOOD PRESSURE: 107 MMHG | HEART RATE: 74 BPM | WEIGHT: 114 LBS | BODY MASS INDEX: 19.46 KG/M2 | DIASTOLIC BLOOD PRESSURE: 65 MMHG

## 2024-10-23 DIAGNOSIS — N92.1 MENORRHAGIA WITH IRREGULAR CYCLE: ICD-10-CM

## 2024-10-23 DIAGNOSIS — N94.6 DYSMENORRHEA: ICD-10-CM

## 2024-10-23 PROCEDURE — 99214 OFFICE O/P EST MOD 30 MIN: CPT | Performed by: STUDENT IN AN ORGANIZED HEALTH CARE EDUCATION/TRAINING PROGRAM

## 2024-10-23 PROCEDURE — 36415 COLL VENOUS BLD VENIPUNCTURE: CPT | Performed by: STUDENT IN AN ORGANIZED HEALTH CARE EDUCATION/TRAINING PROGRAM

## 2024-10-23 RX ORDER — DROSPIRENONE 4 MG/1
1 TABLET, FILM COATED ORAL DAILY
Qty: 28 TABLET | Refills: 11 | Status: SHIPPED | OUTPATIENT
Start: 2024-10-23

## 2024-10-23 RX ORDER — TRANEXAMIC ACID 650 MG/1
1300 TABLET ORAL 3 TIMES DAILY
Qty: 30 TABLET | Refills: 1 | Status: SHIPPED | OUTPATIENT
Start: 2024-10-23

## 2024-10-23 NOTE — PROGRESS NOTES
Department of Obstetrics and Gynecology  Office Visit  Name:  Melida Vazquez   CSN: [id number]    : 2000   Age: 24 y.o.       Chief Complaint   Patient presents with    Dysmenorrhea     C/o menorrhagia and dysmenorrhea. Changing pad/tampon every 30-45 min. [facility] and has been feeling lightheaded , dizziness and has passed out 3 times with going unconscious. Pt questioning if she has endometriosis.        SUBJECTIVE:  Melida Vazquez is a 24 y.o.  who presents for menorrhagia and dysmenorrhea. Had last depo 3 months ago and has continued to have heavy bleeding. Was sent Slynd at previous visit and reports this was never at the pharmacy. Did not have US completed. Reports lightheadedness every day. Does endorse low back pain and headache.     GYN HX:    Menses:   No LMP recorded. Patient has had an injection.    Patient's medications, allergies, past medical, surgical, social and family histories were reviewed and updated as appropriate.    Past Medical History:   Diagnosis Date    Anxiety     Asthma     Depression     Dizziness     Dysmenorrhea     Dyspareunia in female     Irregular menses     Menorrhagia        No past surgical history on file.    Social History     Socioeconomic History    Marital status: Single     Spouse name: Not on file    Number of children: Not on file    Years of education: Not on file    Highest education level: Not on file   Occupational History    Not on file   Tobacco Use    Smoking status: Never    Smokeless tobacco: Never   Vaping Use    Vaping status: Never Used   Substance and Sexual Activity    Alcohol use: Yes     Comment: sometimes    Drug use: Not Currently     Types: Marijuana (Weed)    Sexual activity: Yes   Other Topics Concern    Not on file   Social History Narrative    Not on file     Social Determinants of Health     Financial Resource Strain: Medium Risk (2024)    Overall Financial Resource Strain

## 2024-10-24 LAB
B-HCG SERPL EIA 3RD IS-ACNC: <5 MIU/ML
BASOPHILS # BLD: 0 K/UL (ref 0–0.2)
BASOPHILS NFR BLD: 0.7 %
DEPRECATED RDW RBC AUTO: 14 % (ref 12.4–15.4)
EOSINOPHIL # BLD: 0.2 K/UL (ref 0–0.6)
EOSINOPHIL NFR BLD: 2.2 %
HCT VFR BLD AUTO: 42.2 % (ref 36–48)
HGB BLD-MCNC: 14.4 G/DL (ref 12–16)
LYMPHOCYTES # BLD: 1.8 K/UL (ref 1–5.1)
LYMPHOCYTES NFR BLD: 26 %
MCH RBC QN AUTO: 30.8 PG (ref 26–34)
MCHC RBC AUTO-ENTMCNC: 34.1 G/DL (ref 31–36)
MCV RBC AUTO: 90.4 FL (ref 80–100)
MONOCYTES # BLD: 0.6 K/UL (ref 0–1.3)
MONOCYTES NFR BLD: 9.2 %
NEUTROPHILS # BLD: 4.3 K/UL (ref 1.7–7.7)
NEUTROPHILS NFR BLD: 61.9 %
PLATELET # BLD AUTO: 185 K/UL (ref 135–450)
PMV BLD AUTO: 11.1 FL (ref 5–10.5)
PROLACTIN SERPL IA-MCNC: 15.2 NG/ML
RBC # BLD AUTO: 4.67 M/UL (ref 4–5.2)
TSH SERPL DL<=0.005 MIU/L-ACNC: 3.07 UIU/ML (ref 0.27–4.2)
WBC # BLD AUTO: 6.9 K/UL (ref 4–11)

## 2024-10-31 ENCOUNTER — OFFICE VISIT (OUTPATIENT)
Dept: OBGYN | Age: 24
End: 2024-10-31

## 2024-10-31 VITALS
BODY MASS INDEX: 20.14 KG/M2 | DIASTOLIC BLOOD PRESSURE: 69 MMHG | WEIGHT: 118 LBS | HEIGHT: 64 IN | SYSTOLIC BLOOD PRESSURE: 110 MMHG

## 2024-10-31 DIAGNOSIS — N83.201 RIGHT OVARIAN CYST: ICD-10-CM

## 2024-10-31 DIAGNOSIS — N94.6 DYSMENORRHEA: ICD-10-CM

## 2024-10-31 DIAGNOSIS — N92.1 MENORRHAGIA WITH IRREGULAR CYCLE: Primary | ICD-10-CM

## 2024-10-31 PROCEDURE — 99212 OFFICE O/P EST SF 10 MIN: CPT | Performed by: STUDENT IN AN ORGANIZED HEALTH CARE EDUCATION/TRAINING PROGRAM

## 2024-10-31 NOTE — PROGRESS NOTES
Department of Obstetrics and Gynecology  Office Visit  Name:  Melida Vazquez   CSN: [id number]    : 2000   Age: 24 y.o.       Chief Complaint   Patient presents with    Dysmenorrhea       C/o menorrhagia and dysmenorrhea. Changing pad/tampon every 30-45 min. [facility] and has been feeling lightheaded , dizziness and has passed out 3 times with going unconscious. Pt questioning if she has endometriosis. Pt here to discuss u/s done 10/29/2024, labs 10/23/2024. Pt states she continues to have right sided pain.             SUBJECTIVE:  Melida Vazquez is a 24 y.o.  who presents for menorrhagia and dysmenorrhea. Reports she has not been able to  her birth control or lysteda as she does not have insurance. States she gets this tomorrow and is planning to pick them up then. Reports continued RLQ pain.     GYN HX:    Menses:   No LMP recorded. Patient has had an injection.    Patient's medications, allergies, past medical, surgical, social and family histories were reviewed and updated as appropriate.    Past Medical History:   Diagnosis Date    Anxiety     Asthma     Depression     Dizziness     Dysmenorrhea     Dyspareunia in female     Irregular menses     Menorrhagia        No past surgical history on file.    Social History     Socioeconomic History    Marital status: Single     Spouse name: Not on file    Number of children: Not on file    Years of education: Not on file    Highest education level: Not on file   Occupational History    Not on file   Tobacco Use    Smoking status: Never    Smokeless tobacco: Never   Vaping Use    Vaping status: Never Used   Substance and Sexual Activity    Alcohol use: Yes     Comment: sometimes    Drug use: Not Currently     Types: Marijuana (Weed)    Sexual activity: Yes   Other Topics Concern    Not on file   Social History Narrative    Not on file     Social Determinants of Health     Financial Resource Strain:

## 2024-11-06 ENCOUNTER — TELEPHONE (OUTPATIENT)
Dept: OBGYN | Age: 24
End: 2024-11-06

## 2024-11-06 NOTE — TELEPHONE ENCOUNTER
Pt has come in with pelvic pain and will be scheduled for surgery.  Pt brought in [facility] paperwork to be filled out for intermittent leave for pain.  If approved, how much time off monthly should she be allowed?

## 2024-11-07 NOTE — TELEPHONE ENCOUNTER
Pt requests fmla for intermittent time off for pain and bleeding.  She has already called off 11/2 and 11/6 of this month.  How many days per month will you approve?

## 2024-11-11 ENCOUNTER — PREP FOR PROCEDURE (OUTPATIENT)
Dept: OBGYN | Age: 24
End: 2024-11-11

## 2024-11-11 DIAGNOSIS — G89.29 CHRONIC FEMALE PELVIC PAIN: ICD-10-CM

## 2024-11-11 DIAGNOSIS — R10.2 CHRONIC FEMALE PELVIC PAIN: ICD-10-CM

## 2024-11-11 DIAGNOSIS — N83.201 RIGHT OVARIAN CYST: ICD-10-CM

## 2024-11-11 DIAGNOSIS — R10.2 PELVIC PAIN IN FEMALE: ICD-10-CM

## 2024-11-12 ENCOUNTER — OFFICE VISIT (OUTPATIENT)
Dept: OBGYN | Age: 24
End: 2024-11-12
Payer: COMMERCIAL

## 2024-11-12 VITALS
WEIGHT: 115 LBS | SYSTOLIC BLOOD PRESSURE: 99 MMHG | DIASTOLIC BLOOD PRESSURE: 69 MMHG | BODY MASS INDEX: 19.63 KG/M2 | HEIGHT: 64 IN

## 2024-11-12 DIAGNOSIS — N92.1 MENORRHAGIA WITH IRREGULAR CYCLE: ICD-10-CM

## 2024-11-12 DIAGNOSIS — R10.2 PELVIC PAIN IN FEMALE: ICD-10-CM

## 2024-11-12 DIAGNOSIS — N83.201 RIGHT OVARIAN CYST: Primary | ICD-10-CM

## 2024-11-12 PROCEDURE — 99212 OFFICE O/P EST SF 10 MIN: CPT | Performed by: STUDENT IN AN ORGANIZED HEALTH CARE EDUCATION/TRAINING PROGRAM

## 2024-11-12 NOTE — PROGRESS NOTES
Department of Obstetrics and Gynecology  Preoperative Office Visit  Name:  Melida Vazquez   CSN: [id number]    : 2000   Age: 24 y.o.       Chief Complaint   Patient presents with    Pelvic Pain     Pt c/o pelvic pain and right ovarian cyst. Here to discuss options. Would like to know how long she has to be off work for FMLA       SUBJECTIVE:  Melida Vazquez is a 24 y.o.  who presents with complaint of right ovarian cyst, menorrhagia and pelvic pain. Previously on depo provera and had 1 injection. Has tried OCP before and had mood swings and did not like this. Tried Paragard IUD before and states this was very painful and does not want to try IUDs again. Previously discussed other options including progestin only pill, nexplanon and she desired to try progestin only pill. She was unable to  medication including Slynd and Lysteda due to insurance problems. TVUS 10/30 with uterus measuring 8.36 x 4.63 x 3.94 cm. Right ovarian cyst measuring 3.5cm with internal debris. Discussed expectant management vs surgical management. She desires to proceed with surgical management.       Patient's medications, allergies, past medical, surgical, social and family histories were reviewed and updated as appropriate.    Past Medical History:   Diagnosis Date    Anxiety     Asthma     Depression     Dizziness     Dysmenorrhea     Dyspareunia in female     Irregular menses     Menorrhagia        No past surgical history on file.    Social History     Socioeconomic History    Marital status: Single     Spouse name: Not on file    Number of children: Not on file    Years of education: Not on file    Highest education level: Not on file   Occupational History    Not on file   Tobacco Use    Smoking status: Never    Smokeless tobacco: Never   Vaping Use    Vaping status: Never Used   Substance and Sexual Activity    Alcohol use: Yes     Comment: sometimes    Drug use: Not Currently     Types: Marijuana (Weed)

## 2024-11-13 RX ORDER — ALBUTEROL SULFATE 0.63 MG/3ML
1 SOLUTION RESPIRATORY (INHALATION) PRN
COMMUNITY

## 2024-11-13 NOTE — PROGRESS NOTES
Surgery @ [facility] on 11/18/24 you will be called 11/15/24 with times    NOTHING TO EAT OR DRINK AFTER MIDNIGHT DAY OF SURGERY    1. Enter thru the hospital main entrance on day of surgery, check in at the Information Desk. If you arrive prior to 6:00am, enter thru the ER entrance.    2. Follow the directions as prescribed by the doctor for your procedure and medications.         Morning of surgery take: NO MEDICATIONS          Stop vitamins, supplements and NSAIDS:  NOW     3. Check with your Doctor regarding stopping blood thinners and follow their instructions.    4. Do not smoke, vape or use chewing tobacco morning of surgery. Do not drink any alcoholic beverages 24 hours prior to surgery.       This includes NA Beer. No street drugs 7 days prior to surgery.    5. If you have dentures, contacts of glasses they will be removed before going to the OR; please bring a case.    6. Please bring picture ID, insurance card, paperwork from the doctor’s office (H & P, Consent, & card for implantable devices).    7. Take a shower with an antibacterial soap the night before surgery and the morning of surgery. Do not put anything on your skin      After your morning shower.    8. You will need a responsible adult to drive you home and check on you after surgery.

## 2024-11-13 NOTE — PROGRESS NOTES
Unable to leave message with Melida. Called boyfriend Jori and gave him call back number. Mikayla Bhagat Rn

## 2024-11-15 ENCOUNTER — ANESTHESIA EVENT (OUTPATIENT)
Dept: OPERATING ROOM | Age: 24
End: 2024-11-15
Payer: COMMERCIAL

## 2024-11-15 ENCOUNTER — TELEPHONE (OUTPATIENT)
Dept: OBGYN | Age: 24
End: 2024-11-15

## 2024-11-15 NOTE — ANESTHESIA PRE PROCEDURE
tranexamic acid (LYSTEDA) 650 MG TABS tablet Take 2 tablets by mouth 3 times daily 30 tablet 1   • medroxyPROGESTERone (DEPO-PROVERA) 150 MG/ML injection Inject 1 mL into the muscle every 3 months (Patient not taking: Reported on 11/13/2024)     • ibuprofen (ADVIL;MOTRIN) 400 MG tablet Take 1.5 tablets by mouth every 6 hours as needed for Pain 30 tablet 0   • medroxyPROGESTERone (PROVERA) 10 MG tablet Take 1 tablet by mouth daily for 5 days 5 tablet 0       Allergies:  No Known Allergies    Problem List:    Patient Active Problem List   Diagnosis Code   • Pelvic pain in female R10.2   • Chronic female pelvic pain R10.2, G89.29   • Right ovarian cyst N83.201       Past Medical History:        Diagnosis Date   • Anxiety    • Asthma    • Depression    • Dizziness    • Dysmenorrhea    • Dyspareunia in female    • Irregular menses    • Menorrhagia    • PTSD (post-traumatic stress disorder)     DX AT 13 YEARS NOT TAKING MEDICATIONS   • Suicidal behavior with attempted self-injury (HCC)     LAST TIME WAS 4 YEARS AGO updated 11/13/24       Past Surgical History:  History reviewed. No pertinent surgical history.    Social History:    Social History     Tobacco Use   • Smoking status: Never   • Smokeless tobacco: Never   Substance Use Topics   • Alcohol use: Yes     Comment: 3-4 BEERS ON THE WEEKEND                                Counseling given: Not Answered      Vital Signs (Current):   Vitals:    11/13/24 1112   Weight: 52.2 kg (115 lb)   Height: 1.626 m (5' 4\")                                              BP Readings from Last 3 Encounters:   11/12/24 99/69   10/31/24 110/69   10/23/24 107/65       NPO Status:                                                                                 BMI:   Wt Readings from Last 3 Encounters:   11/12/24 52.2 kg (115 lb)   10/31/24 53.5 kg (118 lb)   10/23/24 51.7 kg (114 lb)     Body mass index is 19.74 kg/m².    CBC:   Lab Results   Component Value Date/Time    WBC 6.9 10/23/2024

## 2024-11-15 NOTE — TELEPHONE ENCOUNTER
Namely message sent:  Paperwork has been faxed back to [facility] for when the patient will be off work 11/18/24 - 11/25/24, rtw 11/26/24.  Unable to speak with patient

## 2024-11-15 NOTE — PROGRESS NOTES
Couldn't LVM for Kaylen's phone but LVM on [facility] phone for surgery @ [facility] on  11/18/24 @ 1300, arrival 1100.

## 2024-11-18 ENCOUNTER — HOSPITAL ENCOUNTER (OUTPATIENT)
Age: 24
Setting detail: OUTPATIENT SURGERY
Discharge: HOME | End: 2024-11-18
Attending: STUDENT IN AN ORGANIZED HEALTH CARE EDUCATION/TRAINING PROGRAM | Admitting: STUDENT IN AN ORGANIZED HEALTH CARE EDUCATION/TRAINING PROGRAM
Payer: COMMERCIAL

## 2024-11-18 ENCOUNTER — ANESTHESIA (OUTPATIENT)
Dept: OPERATING ROOM | Age: 24
End: 2024-11-18
Payer: COMMERCIAL

## 2024-11-18 VITALS
OXYGEN SATURATION: 99 % | SYSTOLIC BLOOD PRESSURE: 98 MMHG | BODY MASS INDEX: 19.63 KG/M2 | TEMPERATURE: 97.5 F | WEIGHT: 115 LBS | HEART RATE: 64 BPM | DIASTOLIC BLOOD PRESSURE: 64 MMHG | RESPIRATION RATE: 16 BRPM | HEIGHT: 64 IN

## 2024-11-18 DIAGNOSIS — G89.18 POSTOPERATIVE PAIN: Primary | ICD-10-CM

## 2024-11-18 LAB
BASOPHILS # BLD: 0.04 K/UL
BASOPHILS NFR BLD: 1 % (ref 0–1)
EOSINOPHIL # BLD: 0.05 K/UL
EOSINOPHILS RELATIVE PERCENT: 1 % (ref 0–3)
ERYTHROCYTE [DISTWIDTH] IN BLOOD BY AUTOMATED COUNT: 13.3 % (ref 11.7–14.9)
HCT VFR BLD AUTO: 43.4 % (ref 37–47)
HGB BLD-MCNC: 14.2 G/DL (ref 12.5–16)
IMM GRANULOCYTES # BLD AUTO: 0.01 K/UL
IMM GRANULOCYTES NFR BLD: 0 %
LYMPHOCYTES NFR BLD: 1.35 K/UL
LYMPHOCYTES RELATIVE PERCENT: 30 % (ref 24–44)
MCH RBC QN AUTO: 29.6 PG (ref 27–31)
MCHC RBC AUTO-ENTMCNC: 32.7 G/DL (ref 32–36)
MCV RBC AUTO: 90.4 FL (ref 78–100)
MONOCYTES NFR BLD: 0.35 K/UL
MONOCYTES NFR BLD: 8 % (ref 0–4)
NEUTROPHILS NFR BLD: 60 % (ref 36–66)
NEUTS SEG NFR BLD: 2.64 K/UL
PLATELET # BLD AUTO: 230 K/UL (ref 140–440)
PMV BLD AUTO: 11.3 FL (ref 7.5–11.1)
RBC # BLD AUTO: 4.8 M/UL (ref 4.2–5.4)
WBC OTHER # BLD: 4.4 K/UL (ref 4–10.5)

## 2024-11-18 PROCEDURE — 2500000003 HC RX 250 WO HCPCS: Performed by: NURSE ANESTHETIST, CERTIFIED REGISTERED

## 2024-11-18 PROCEDURE — 7100000001 HC PACU RECOVERY - ADDTL 15 MIN: Performed by: STUDENT IN AN ORGANIZED HEALTH CARE EDUCATION/TRAINING PROGRAM

## 2024-11-18 PROCEDURE — 3600000015 HC SURGERY LEVEL 5 ADDTL 15MIN: Performed by: STUDENT IN AN ORGANIZED HEALTH CARE EDUCATION/TRAINING PROGRAM

## 2024-11-18 PROCEDURE — 6360000002 HC RX W HCPCS: Performed by: STUDENT IN AN ORGANIZED HEALTH CARE EDUCATION/TRAINING PROGRAM

## 2024-11-18 PROCEDURE — 2720000010 HC SURG SUPPLY STERILE: Performed by: STUDENT IN AN ORGANIZED HEALTH CARE EDUCATION/TRAINING PROGRAM

## 2024-11-18 PROCEDURE — 85025 COMPLETE CBC W/AUTO DIFF WBC: CPT

## 2024-11-18 PROCEDURE — 7100000010 HC PHASE II RECOVERY - FIRST 15 MIN: Performed by: STUDENT IN AN ORGANIZED HEALTH CARE EDUCATION/TRAINING PROGRAM

## 2024-11-18 PROCEDURE — 3700000001 HC ADD 15 MINUTES (ANESTHESIA): Performed by: STUDENT IN AN ORGANIZED HEALTH CARE EDUCATION/TRAINING PROGRAM

## 2024-11-18 PROCEDURE — 2709999900 HC NON-CHARGEABLE SUPPLY: Performed by: STUDENT IN AN ORGANIZED HEALTH CARE EDUCATION/TRAINING PROGRAM

## 2024-11-18 PROCEDURE — 2580000003 HC RX 258: Performed by: STUDENT IN AN ORGANIZED HEALTH CARE EDUCATION/TRAINING PROGRAM

## 2024-11-18 PROCEDURE — 3600000005 HC SURGERY LEVEL 5 BASE: Performed by: STUDENT IN AN ORGANIZED HEALTH CARE EDUCATION/TRAINING PROGRAM

## 2024-11-18 PROCEDURE — 6360000002 HC RX W HCPCS: Performed by: ANESTHESIOLOGY

## 2024-11-18 PROCEDURE — 7100000011 HC PHASE II RECOVERY - ADDTL 15 MIN: Performed by: STUDENT IN AN ORGANIZED HEALTH CARE EDUCATION/TRAINING PROGRAM

## 2024-11-18 PROCEDURE — 6360000002 HC RX W HCPCS: Performed by: NURSE ANESTHETIST, CERTIFIED REGISTERED

## 2024-11-18 PROCEDURE — 49320 DIAG LAPARO SEPARATE PROC: CPT | Performed by: STUDENT IN AN ORGANIZED HEALTH CARE EDUCATION/TRAINING PROGRAM

## 2024-11-18 PROCEDURE — 7100000000 HC PACU RECOVERY - FIRST 15 MIN: Performed by: STUDENT IN AN ORGANIZED HEALTH CARE EDUCATION/TRAINING PROGRAM

## 2024-11-18 PROCEDURE — 81025 URINE PREGNANCY TEST: CPT

## 2024-11-18 PROCEDURE — 3700000000 HC ANESTHESIA ATTENDED CARE: Performed by: STUDENT IN AN ORGANIZED HEALTH CARE EDUCATION/TRAINING PROGRAM

## 2024-11-18 PROCEDURE — 2580000003 HC RX 258: Performed by: NURSE ANESTHETIST, CERTIFIED REGISTERED

## 2024-11-18 RX ORDER — LIDOCAINE HYDROCHLORIDE 20 MG/ML
INJECTION, SOLUTION EPIDURAL; INFILTRATION; INTRACAUDAL; PERINEURAL
Status: DISCONTINUED | OUTPATIENT
Start: 2024-11-18 | End: 2024-11-18 | Stop reason: SDUPTHER

## 2024-11-18 RX ORDER — LABETALOL HYDROCHLORIDE 5 MG/ML
10 INJECTION, SOLUTION INTRAVENOUS
Status: DISCONTINUED | OUTPATIENT
Start: 2024-11-18 | End: 2024-11-18 | Stop reason: HOSPADM

## 2024-11-18 RX ORDER — ACETAMINOPHEN 500 MG
1000 TABLET ORAL EVERY 6 HOURS PRN
Qty: 120 TABLET | Refills: 0 | Status: SHIPPED | OUTPATIENT
Start: 2024-11-18

## 2024-11-18 RX ORDER — SODIUM CHLORIDE 9 MG/ML
INJECTION, SOLUTION INTRAVENOUS PRN
Status: DISCONTINUED | OUTPATIENT
Start: 2024-11-18 | End: 2024-11-18 | Stop reason: HOSPADM

## 2024-11-18 RX ORDER — SODIUM CHLORIDE 0.9 % (FLUSH) 0.9 %
5-40 SYRINGE (ML) INJECTION EVERY 12 HOURS SCHEDULED
Status: DISCONTINUED | OUTPATIENT
Start: 2024-11-18 | End: 2024-11-18 | Stop reason: HOSPADM

## 2024-11-18 RX ORDER — ONDANSETRON 2 MG/ML
4 INJECTION INTRAMUSCULAR; INTRAVENOUS
Status: DISCONTINUED | OUTPATIENT
Start: 2024-11-18 | End: 2024-11-18 | Stop reason: HOSPADM

## 2024-11-18 RX ORDER — ACETAMINOPHEN 325 MG/1
650 TABLET ORAL
Status: DISCONTINUED | OUTPATIENT
Start: 2024-11-18 | End: 2024-11-18 | Stop reason: HOSPADM

## 2024-11-18 RX ORDER — MEPERIDINE HYDROCHLORIDE 25 MG/ML
12.5 INJECTION INTRAMUSCULAR; INTRAVENOUS; SUBCUTANEOUS EVERY 5 MIN PRN
Status: DISCONTINUED | OUTPATIENT
Start: 2024-11-18 | End: 2024-11-18 | Stop reason: HOSPADM

## 2024-11-18 RX ORDER — ACETAMINOPHEN 500 MG
1000 TABLET ORAL ONCE
Status: DISCONTINUED | OUTPATIENT
Start: 2024-11-18 | End: 2024-11-18 | Stop reason: HOSPADM

## 2024-11-18 RX ORDER — KETOROLAC TROMETHAMINE 30 MG/ML
15 INJECTION, SOLUTION INTRAMUSCULAR; INTRAVENOUS ONCE
Status: COMPLETED | OUTPATIENT
Start: 2024-11-18 | End: 2024-11-18

## 2024-11-18 RX ORDER — SODIUM CHLORIDE, SODIUM LACTATE, POTASSIUM CHLORIDE, CALCIUM CHLORIDE 600; 310; 30; 20 MG/100ML; MG/100ML; MG/100ML; MG/100ML
INJECTION, SOLUTION INTRAVENOUS
Status: DISCONTINUED | OUTPATIENT
Start: 2024-11-18 | End: 2024-11-18 | Stop reason: SDUPTHER

## 2024-11-18 RX ORDER — ONDANSETRON 4 MG/1
4 TABLET, FILM COATED ORAL 3 TIMES DAILY PRN
Qty: 15 TABLET | Refills: 1 | Status: SHIPPED | OUTPATIENT
Start: 2024-11-18

## 2024-11-18 RX ORDER — CELECOXIB 200 MG/1
200 CAPSULE ORAL ONCE
Status: DISCONTINUED | OUTPATIENT
Start: 2024-11-18 | End: 2024-11-18 | Stop reason: HOSPADM

## 2024-11-18 RX ORDER — FENTANYL CITRATE 50 UG/ML
25 INJECTION, SOLUTION INTRAMUSCULAR; INTRAVENOUS EVERY 5 MIN PRN
Status: DISCONTINUED | OUTPATIENT
Start: 2024-11-18 | End: 2024-11-18 | Stop reason: HOSPADM

## 2024-11-18 RX ORDER — FENTANYL CITRATE 50 UG/ML
INJECTION, SOLUTION INTRAMUSCULAR; INTRAVENOUS
Status: DISCONTINUED | OUTPATIENT
Start: 2024-11-18 | End: 2024-11-18 | Stop reason: SDUPTHER

## 2024-11-18 RX ORDER — SODIUM CHLORIDE, SODIUM LACTATE, POTASSIUM CHLORIDE, CALCIUM CHLORIDE 600; 310; 30; 20 MG/100ML; MG/100ML; MG/100ML; MG/100ML
INJECTION, SOLUTION INTRAVENOUS CONTINUOUS
Status: DISCONTINUED | OUTPATIENT
Start: 2024-11-18 | End: 2024-11-18 | Stop reason: HOSPADM

## 2024-11-18 RX ORDER — PROPOFOL 10 MG/ML
INJECTION, EMULSION INTRAVENOUS
Status: DISCONTINUED | OUTPATIENT
Start: 2024-11-18 | End: 2024-11-18 | Stop reason: SDUPTHER

## 2024-11-18 RX ORDER — SODIUM CHLORIDE 0.9 % (FLUSH) 0.9 %
5-40 SYRINGE (ML) INJECTION PRN
Status: DISCONTINUED | OUTPATIENT
Start: 2024-11-18 | End: 2024-11-18 | Stop reason: HOSPADM

## 2024-11-18 RX ORDER — NALOXONE HYDROCHLORIDE 0.4 MG/ML
INJECTION, SOLUTION INTRAMUSCULAR; INTRAVENOUS; SUBCUTANEOUS PRN
Status: DISCONTINUED | OUTPATIENT
Start: 2024-11-18 | End: 2024-11-18 | Stop reason: HOSPADM

## 2024-11-18 RX ORDER — OXYCODONE HYDROCHLORIDE 5 MG/1
5 TABLET ORAL EVERY 6 HOURS PRN
Qty: 10 TABLET | Refills: 0 | Status: SHIPPED | OUTPATIENT
Start: 2024-11-18 | End: 2024-11-21

## 2024-11-18 RX ORDER — HALOPERIDOL 5 MG/ML
1 INJECTION INTRAMUSCULAR
Status: DISCONTINUED | OUTPATIENT
Start: 2024-11-18 | End: 2024-11-18 | Stop reason: HOSPADM

## 2024-11-18 RX ORDER — SODIUM CHLORIDE 9 MG/ML
INJECTION, SOLUTION INTRAVENOUS CONTINUOUS
Status: DISCONTINUED | OUTPATIENT
Start: 2024-11-18 | End: 2024-11-18 | Stop reason: HOSPADM

## 2024-11-18 RX ORDER — OXYCODONE HYDROCHLORIDE 5 MG/1
5 TABLET ORAL PRN
Status: DISCONTINUED | OUTPATIENT
Start: 2024-11-18 | End: 2024-11-18 | Stop reason: HOSPADM

## 2024-11-18 RX ORDER — ROCURONIUM BROMIDE 10 MG/ML
INJECTION, SOLUTION INTRAVENOUS
Status: DISCONTINUED | OUTPATIENT
Start: 2024-11-18 | End: 2024-11-18 | Stop reason: SDUPTHER

## 2024-11-18 RX ORDER — IBUPROFEN 800 MG/1
800 TABLET, FILM COATED ORAL EVERY 6 HOURS PRN
Qty: 30 TABLET | Refills: 1 | Status: SHIPPED | OUTPATIENT
Start: 2024-11-18

## 2024-11-18 RX ORDER — OXYCODONE HYDROCHLORIDE 5 MG/1
10 TABLET ORAL PRN
Status: DISCONTINUED | OUTPATIENT
Start: 2024-11-18 | End: 2024-11-18 | Stop reason: HOSPADM

## 2024-11-18 RX ORDER — DOCUSATE SODIUM 100 MG/1
100 CAPSULE, LIQUID FILLED ORAL 2 TIMES DAILY PRN
Qty: 30 CAPSULE | Refills: 1 | Status: SHIPPED | OUTPATIENT
Start: 2024-11-18

## 2024-11-18 RX ORDER — BUPIVACAINE HYDROCHLORIDE 5 MG/ML
INJECTION, SOLUTION PERINEURAL
Status: COMPLETED | OUTPATIENT
Start: 2024-11-18 | End: 2024-11-18

## 2024-11-18 RX ADMIN — HYDROMORPHONE HYDROCHLORIDE 0.5 MG: 1 INJECTION, SOLUTION INTRAMUSCULAR; INTRAVENOUS; SUBCUTANEOUS at 14:19

## 2024-11-18 RX ADMIN — PHENYLEPHRINE HYDROCHLORIDE 50 MCG: 10 INJECTION INTRAVENOUS at 13:36

## 2024-11-18 RX ADMIN — FENTANYL CITRATE 100 MCG: 50 INJECTION, SOLUTION INTRAMUSCULAR; INTRAVENOUS at 13:29

## 2024-11-18 RX ADMIN — LIDOCAINE HYDROCHLORIDE 100 MG: 20 INJECTION, SOLUTION EPIDURAL; INFILTRATION; INTRACAUDAL; PERINEURAL at 13:29

## 2024-11-18 RX ADMIN — ROCURONIUM BROMIDE 50 MG: 10 INJECTION, SOLUTION INTRAVENOUS at 13:29

## 2024-11-18 RX ADMIN — PHENYLEPHRINE HYDROCHLORIDE 50 MCG: 10 INJECTION INTRAVENOUS at 13:58

## 2024-11-18 RX ADMIN — PROPOFOL 200 MG: 10 INJECTION, EMULSION INTRAVENOUS at 13:29

## 2024-11-18 RX ADMIN — KETOROLAC TROMETHAMINE 15 MG: 30 INJECTION, SOLUTION INTRAMUSCULAR; INTRAVENOUS at 15:38

## 2024-11-18 RX ADMIN — PHENYLEPHRINE HYDROCHLORIDE 50 MCG: 10 INJECTION INTRAVENOUS at 13:39

## 2024-11-18 RX ADMIN — SUGAMMADEX 200 MG: 100 INJECTION, SOLUTION INTRAVENOUS at 14:15

## 2024-11-18 RX ADMIN — SODIUM CHLORIDE, POTASSIUM CHLORIDE, SODIUM LACTATE AND CALCIUM CHLORIDE: 600; 310; 30; 20 INJECTION, SOLUTION INTRAVENOUS at 13:25

## 2024-11-18 ASSESSMENT — PAIN SCALES - GENERAL
PAINLEVEL_OUTOF10: 5
PAINLEVEL_OUTOF10: 0

## 2024-11-18 ASSESSMENT — PAIN DESCRIPTION - ONSET: ONSET: ON-GOING

## 2024-11-18 ASSESSMENT — PAIN DESCRIPTION - ORIENTATION: ORIENTATION: MID;LOWER

## 2024-11-18 ASSESSMENT — PAIN DESCRIPTION - DESCRIPTORS
DESCRIPTORS: ACHING;CRAMPING
DESCRIPTORS: ACHING

## 2024-11-18 ASSESSMENT — PAIN - FUNCTIONAL ASSESSMENT
PAIN_FUNCTIONAL_ASSESSMENT: 0-10
PAIN_FUNCTIONAL_ASSESSMENT: ACTIVITIES ARE NOT PREVENTED
PAIN_FUNCTIONAL_ASSESSMENT: PREVENTS OR INTERFERES SOME ACTIVE ACTIVITIES AND ADLS
PAIN_FUNCTIONAL_ASSESSMENT: 0-10

## 2024-11-18 ASSESSMENT — PAIN DESCRIPTION - FREQUENCY: FREQUENCY: CONTINUOUS

## 2024-11-18 ASSESSMENT — PAIN DESCRIPTION - LOCATION: LOCATION: ABDOMEN

## 2024-11-18 ASSESSMENT — PAIN DESCRIPTION - PAIN TYPE: TYPE: SURGICAL PAIN

## 2024-11-18 ASSESSMENT — PAIN SCALES - WONG BAKER: WONGBAKER_NUMERICALRESPONSE: NO HURT

## 2024-11-18 NOTE — PROGRESS NOTES
Outpatient Pharmacy Progress Note for Meds-to-Beds    Total number of Prescriptions Filled: 5    Additional Documentation:  Patient's family member picked-up the medication(s) in the OP Pharmacy      Thank you for letting us serve your patients.  [address] [address]    Phone: [phone]    Fax: [phone]

## 2024-11-18 NOTE — PROGRESS NOTES
1423: Patient arrived in PACU s/p ex lap. Received report from Shaista MORGAN. Patient has oral airway in. VSS. Patient has x2 lap sites on the abdomen, well approximated with sutures surgical glue, no bleeding.   1432: Patient awake and oral airway is out.   1435: Patient awake and following commands. VSS. 97% on room air. Incisions well approximated no bleeding.   1500: RN transported patient to room 3 on SDS, bedside handoff report given to Magda AMANDA RN. Call light in reach.

## 2024-11-18 NOTE — OP NOTE
Department of Obstetrics and Gynecology  Operative Report  Name:  Melida Vazquez   CSN: [id number]   Attending Provider: Sultana Alcaraz DO  Location:  OR/NONE   : 2000   Age: 24 y.o.    SURGEON: Surgeon(s):  Sultana Alcaraz DO       PRE-OP DIAGNOSIS: Pelvic pain in female [R10.2]  Chronic female pelvic pain [R10.2, G89.29]  Right ovarian cyst [N83.201]     POST-OP DIAGNOSIS: Same as above, stage I endometriosis    PROCEDURE: Procedure(s):  LAPAROSCOPY EXPLORATORY; ASPIRATION OF PELVIC FLUID     SPECIMENS: None    ANESTHESIA: General     EBL:  <10 cc    DEVICES IMPLANTED: None    COMPLICATIONS: None    INDICATIONS FOR PROCEDURE: Melida Vazquez is a 24 y.o. female who presented to the office c/o right ovarian cyst and pelvic pain. Previously on depo provera for menorrhagia and had 1 injection. Had tried OCP before and had mood swings and did not like this. Tried Paragard IUD before and states this was very painful and does not want to try IUDs again. Previously discussed other options including progestin only pill, nexplanon and she desired to try progestin only pill. She was unable to  medication including Slynd and Lysteda due to insurance problems. TVUS 10/30 with uterus measuring 8.36 x 4.63 x 3.94 cm. Right ovarian cyst measuring 3.5cm with internal debris. Discussed expectant management vs surgical management. She desires to proceed with surgical management for right ovarian cyst and pelvic pain.      FINDINGS: On bimanual exam, the uterus was noted to be anteverted and of normal size, shape, and symmetry without any adnexal masses palpated. On laparoscopic exam what was visualized of the liver, bowel, and omentum was within normal limits. Bilateral round ligaments, bilateral uterosacral ligaments, bilateral ovarian fossae, bilateral tubes and ovaries, posterior cul-de-sac, and bladder were free of any adhesions. Superficial endometrial implant noted on left uterosacral ligament. Small <1mm

## 2024-11-18 NOTE — DISCHARGE INSTRUCTIONS
exercise regularly, you should not restart until the doctor tells you that it is OK to do so. It is OK to walk as your body tolerates.     Medication:     You may resume all your usual medications after surgery, unless told otherwise. While you will be sent home with strong pain medications containing small amounts of narcotics, you may also take milder medications such as Naprosyn (Aleve), Acetaminophen (Tylenol), or Ibuprofen (Motrin) for your pain.   At first, take the narcotic pain medicines during the day, and use the milder medications to “take the edge off” in between.   Take your pain medication when you first begin to feel discomfort. Do not wait until your pain is intense to take your medications.    The narcotic pain medications may cause nausea or constipation. As you heal, you may find that you feel better without those medications. If Acetaminophen (Tylenol) or Ibuprofen (Motrin) relieves the pain, use these medications instead.     Incision:     Abdominal incision:   You may get the incision wet in the shower but avoid tub baths for two weeks.   If the incision appears dirty or caked, you may clean it using hydrogen peroxide or a cotton swab.   If you have small plastic bandages called “steri-strips” on the incision, they may gradually fall off in the shower. You may trim the curled edges with scissors. They should fall off within one to two weeks; if not, you may gently pull them off.     Vaginal Bleeding: It is normal to experience vaginal spotting and light discharge for up to a month after surgery, whether incision was abdominal or vaginal.     Bladder Catheter: If you are discharged from the hospital with a catheter, you should return to the physician’s office as instructed by your surgeon for catheter removal.     Return Visit: You will need to return to see your surgeon two weeks after the date of your surgery. At that time, your incision will be checked and any questions you may have will be

## 2024-11-18 NOTE — ANESTHESIA POSTPROCEDURE EVALUATION
Department of Anesthesiology  Postprocedure Note    Patient: Melida Vazquez  MRN: [id number]  YOB: 2000  Date of evaluation: 11/18/2024    Procedure Summary       Date: 11/18/24 Room / Location: [address]    Anesthesia Start: 1325 Anesthesia Stop: 1428    Procedure: LAPAROSCOPY EXPLORATORY; ASPIRATION OF PELVIC FLUID Diagnosis:       Pelvic pain in female      Chronic female pelvic pain      Right ovarian cyst      (Pelvic pain in female [R10.2])      (Chronic female pelvic pain [R10.2, G89.29])      (Right ovarian cyst [N83.201])    Surgeons: Sultana Alcaraz DO Responsible Provider: Siva Landers MD    Anesthesia Type: general ASA Status: 2            Anesthesia Type: No value filed.    Ellis Phase I: Ellis Score: 5    Ellis Phase II:      Anesthesia Post Evaluation    Patient location during evaluation: bedside  Patient participation: complete - patient participated  Level of consciousness: awake  Airway patency: patent  Nausea & Vomiting: no nausea  Cardiovascular status: blood pressure returned to baseline  Respiratory status: acceptable  Hydration status: euvolemic  Pain management: adequate    No notable events documented.

## 2024-11-18 NOTE — H&P
Department of Obstetrics and Gynecology  Preoperative History and Physical  Name:  Melida Vazquez   CSN: [id number]   Attending Provider: Sultana Alcaraz DO  Location:  OR/NONE   : 2000   Age: 24 y.o.    Subjective:    Chief Complaint:  Right ovarian cyst, pelvic pain    HPI:  Pt is a 24 y.o. P0 here today for scheduled Procedure(s):  LAPAROSCOPY EXPLORATORY  OVARIAN CYSTECTOMY LAPAROSCOPIC for complaint of right ovarian cyst and pelvic pain. Previously on depo provera for menorrhagia and had 1 injection. Had tried OCP before and had mood swings and did not like this. Tried Paragard IUD before and states this was very painful and does not want to try IUDs again. Previously discussed other options including progestin only pill, nexplanon and she desired to try progestin only pill. She was unable to  medication including Slynd and Lysteda due to insurance problems. TVUS 10/30 with uterus measuring 8.36 x 4.63 x 3.94 cm. Right ovarian cyst measuring 3.5cm with internal debris. Discussed expectant management vs surgical management. She desires to proceed with surgical management for right ovarian cyst and pelvic pain.      Primary Care Physician:  No primary care provider on file.    No Known Allergies    Current Facility-Administered Medications   Medication Dose Route Frequency Provider Last Rate Last Admin    0.9 % sodium chloride infusion   IntraVENous Continuous Sultana Alcaraz DO        sodium chloride flush 0.9 % injection 5-40 mL  5-40 mL IntraVENous 2 times per day Sultana Alcaraz DO        sodium chloride flush 0.9 % injection 5-40 mL  5-40 mL IntraVENous PRN Sultana Alcaraz DO        0.9 % sodium chloride infusion   IntraVENous PRN Sultana Alcaraz DO        acetaminophen (TYLENOL) tablet 1,000 mg  1,000 mg Oral Once Sultana Alcaraz DO        celecoxib (CELEBREX) capsule 200 mg  200 mg Oral Once Sultana Alcaraz DO           Past Medical History:   Diagnosis Date    Anxiety     Asthma     Depression

## 2024-11-18 NOTE — PROGRESS NOTES
Returned to room 3. Report received from Bren MORGAN. Drowsy, responds appropriately. Abdominal incisions x2 are dry and intact. Refuses beverage at this time. Call light in reach. Significant other at bedside. Call light in reach.

## 2024-11-19 LAB — HCG, PREGNANCY URINE (POC): NEGATIVE

## 2024-11-22 ENCOUNTER — TELEPHONE (OUTPATIENT)
Dept: OBGYN | Age: 24
End: 2024-11-22

## 2024-11-22 NOTE — TELEPHONE ENCOUNTER
Pt had a laparoscopy exploratory: aspiration of pelvic fluid 11/18/2024. Pt called in regards to her disability paperwork after surgery. Pt says she can return to work 11/26. Pt states she thought she was going to be off longer. Pt has a post op scheduled 12/3/2024. Please advise.

## 2024-11-25 ENCOUNTER — HOSPITAL ENCOUNTER (EMERGENCY)
Age: 24
Discharge: HOME | End: 2024-11-25
Payer: COMMERCIAL

## 2024-11-25 VITALS
RESPIRATION RATE: 16 BRPM | TEMPERATURE: 97.6 F | SYSTOLIC BLOOD PRESSURE: 126 MMHG | WEIGHT: 115 LBS | HEART RATE: 94 BPM | HEIGHT: 64 IN | OXYGEN SATURATION: 99 % | DIASTOLIC BLOOD PRESSURE: 69 MMHG | BODY MASS INDEX: 19.63 KG/M2

## 2024-11-25 DIAGNOSIS — R10.9 ABDOMINAL PAIN, UNSPECIFIED ABDOMINAL LOCATION: Primary | ICD-10-CM
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
## 2024-11-25 PROCEDURE — 99282 EMERGENCY DEPT VISIT SF MDM: CPT

## 2024-11-25 ASSESSMENT — PAIN SCALES - GENERAL
PAINLEVEL_OUTOF10: 0
PAINLEVEL_OUTOF10: 5

## 2024-11-25 ASSESSMENT — PAIN - FUNCTIONAL ASSESSMENT
PAIN_FUNCTIONAL_ASSESSMENT: 0-10
PAIN_FUNCTIONAL_ASSESSMENT: PREVENTS OR INTERFERES SOME ACTIVE ACTIVITIES AND ADLS
PAIN_FUNCTIONAL_ASSESSMENT: 0-10

## 2024-11-25 ASSESSMENT — PAIN DESCRIPTION - ORIENTATION: ORIENTATION: RIGHT

## 2024-11-25 ASSESSMENT — PAIN DESCRIPTION - LOCATION: LOCATION: INCISION

## 2024-11-25 NOTE — ED PROVIDER NOTES
[facility] EMERGENCY DEPARTMENT  EMERGENCY DEPARTMENT ENCOUNTER        Pt Name: Melida Vazquez  MRN: [id number]  Birthdate 2000  Date of evaluation: 11/25/2024  Provider: ROCCO FERNANDEZ - CNP  PCP: No primary care provider on file.    FARHEEN. I have evaluated this patient.        Triage CHIEF COMPLAINT       Chief Complaint   Patient presents with    Fall     Hit right lower abdomen, just had laparoscopic surgery last Friday for cyst removed, pain at site and abdomen.          HISTORY OF PRESENT ILLNESS      Chief Complaint: fall    Melida Vazquez is a 24 y.o. female who presents for evaluation after a mechanical fall.  States she went fishing this afternoon by herself and slipped on the bank landing on her buttocks.  She denies head injury, LOC.  She denies any back or buttocks pain.  No other injuries.  Had laparoscopic cyst removal by Dr. Alcaraz on 11/15/2024.  Was concerned about damage to her sutures.  She denies any significant increase in abdominal pain after the fall.      Nursing Notes were all reviewed and agreed with or any disagreements were addressed in the HPI.    REVIEW OF SYSTEMS     Pertinent ROS as noted in HPI.      PAST MEDICAL HISTORY     Past Medical History:   Diagnosis Date    Anxiety     Asthma     Depression     Dizziness     Dysmenorrhea     Dyspareunia in female     Irregular menses     Menorrhagia     PTSD (post-traumatic stress disorder)     DX AT 13 YEARS NOT TAKING MEDICATIONS    Suicidal behavior with attempted self-injury (HCC)     LAST TIME WAS 4 YEARS AGO updated 11/13/24       SURGICAL HISTORY     Past Surgical History:   Procedure Laterality Date    LAPAROSCOPY N/A 11/18/2024    LAPAROSCOPY EXPLORATORY; ASPIRATION OF PELVIC FLUID performed by Sultana Alcaraz DO at [facility] OR       CURRENTMEDICATIONS       Discharge Medication List as of 11/25/2024  6:33 PM        CONTINUE these medications which have NOT CHANGED    Details

## 2024-12-02 PROBLEM — N80.9 ENDOMETRIOSIS DETERMINED BY LAPAROSCOPY: Status: ACTIVE | Noted: 2024-12-02

## 2024-12-03 ENCOUNTER — OFFICE VISIT (OUTPATIENT)
Dept: OBGYN | Age: 24
End: 2024-12-03

## 2024-12-03 VITALS
DIASTOLIC BLOOD PRESSURE: 60 MMHG | BODY MASS INDEX: 19.29 KG/M2 | SYSTOLIC BLOOD PRESSURE: 103 MMHG | HEIGHT: 64 IN | WEIGHT: 113 LBS

## 2024-12-03 DIAGNOSIS — Z98.890 STATUS POST LAPAROSCOPY: ICD-10-CM

## 2024-12-03 DIAGNOSIS — N83.201 RIGHT OVARIAN CYST: ICD-10-CM

## 2024-12-03 DIAGNOSIS — Z09 POSTOPERATIVE EXAMINATION: Primary | ICD-10-CM

## 2024-12-03 DIAGNOSIS — N80.9 ENDOMETRIOSIS DETERMINED BY LAPAROSCOPY: ICD-10-CM

## 2024-12-03 DIAGNOSIS — R10.2 PELVIC PAIN IN FEMALE: ICD-10-CM

## 2024-12-03 PROCEDURE — 99024 POSTOP FOLLOW-UP VISIT: CPT | Performed by: STUDENT IN AN ORGANIZED HEALTH CARE EDUCATION/TRAINING PROGRAM

## 2024-12-03 NOTE — PROGRESS NOTES
Department of Obstetrics and Gynecology  Postoperative Office Visit  Name:  Melida Vazquez   CSN: [id number]    : 2000   Age: 24 y.o.     Chief Complaint   Patient presents with    Post-Op Check     LAPAROSCOPY EXPLORATORY; ASPIRATION OF PELVIC FLUID on 2024. C/o sharp pain near incision site.        Melida Vazquez  2000 is a 24 y.o.  Female who presents of the office today for a post op follow up after Exploratory laparoscopy and aspiration of pelvic fluid on 24. Eating a regular diet without difficulty. Bowel movements are normal. Pain improving.                ALLERGIES  No Known Allergies    CURRENT MEDICATIONS  Current Outpatient Medications   Medication Sig Dispense Refill    acetaminophen (TYLENOL) 500 MG tablet Take 2 tablets by mouth every 6 hours as needed for Pain Maximum dose- 8 tablets/24 hours. 120 tablet 0    ibuprofen (ADVIL;MOTRIN) 800 MG tablet Take 1 tablet by mouth every 6 hours as needed for Pain 30 tablet 1    docusate sodium (COLACE) 100 MG capsule Take 1 capsule by mouth 2 times daily as needed for Constipation 30 capsule 1    ondansetron (ZOFRAN) 4 MG tablet Take 1 tablet by mouth 3 times daily as needed for Nausea or Vomiting 15 tablet 1    Pediatric Multivit-Minerals-C (CHILDRENS VITAMINS PO) Take by mouth      melatonin 3 MG TABS tablet Take 1 tablet by mouth nightly as needed      albuterol (ACCUNEB) 0.63 MG/3ML nebulizer solution Take 3 mLs by nebulization as needed for Wheezing ASTHMA      Drospirenone (SLYND) 4 MG TABS Take 1 tablet by mouth daily 28 tablet 11    tranexamic acid (LYSTEDA) 650 MG TABS tablet Take 2 tablets by mouth 3 times daily 30 tablet 1     No current facility-administered medications for this visit.       Review of Systems  Constitutional: negative for chills, fevers and sweats  Respiratory: negative for cough, dyspnea on exertion, hemoptysis and wheezing  Cardiovascular: negative for chest pain and chest

## 2025-01-23 ENCOUNTER — OFFICE VISIT (OUTPATIENT)
Age: 25
End: 2025-01-23
Payer: COMMERCIAL

## 2025-01-23 VITALS
WEIGHT: 112 LBS | SYSTOLIC BLOOD PRESSURE: 119 MMHG | BODY MASS INDEX: 19.12 KG/M2 | HEIGHT: 64 IN | HEART RATE: 78 BPM | DIASTOLIC BLOOD PRESSURE: 67 MMHG

## 2025-01-23 DIAGNOSIS — N92.1 MENORRHAGIA WITH IRREGULAR CYCLE: Primary | ICD-10-CM

## 2025-01-23 DIAGNOSIS — Z11.3 SCREENING EXAMINATION FOR STD (SEXUALLY TRANSMITTED DISEASE): ICD-10-CM

## 2025-01-23 DIAGNOSIS — N80.9 ENDOMETRIOSIS DETERMINED BY LAPAROSCOPY: ICD-10-CM

## 2025-01-23 DIAGNOSIS — R10.2 CHRONIC FEMALE PELVIC PAIN: ICD-10-CM

## 2025-01-23 DIAGNOSIS — G89.29 CHRONIC FEMALE PELVIC PAIN: ICD-10-CM

## 2025-01-23 DIAGNOSIS — N89.8 VAGINAL ODOR: ICD-10-CM

## 2025-01-23 DIAGNOSIS — R35.0 URINARY FREQUENCY: ICD-10-CM

## 2025-01-23 PROCEDURE — 99214 OFFICE O/P EST MOD 30 MIN: CPT | Performed by: STUDENT IN AN ORGANIZED HEALTH CARE EDUCATION/TRAINING PROGRAM

## 2025-01-23 SDOH — ECONOMIC STABILITY: FOOD INSECURITY: WITHIN THE PAST 12 MONTHS, THE FOOD YOU BOUGHT JUST DIDN'T LAST AND YOU DIDN'T HAVE MONEY TO GET MORE.: NEVER TRUE

## 2025-01-23 SDOH — ECONOMIC STABILITY: FOOD INSECURITY: WITHIN THE PAST 12 MONTHS, YOU WORRIED THAT YOUR FOOD WOULD RUN OUT BEFORE YOU GOT MONEY TO BUY MORE.: NEVER TRUE

## 2025-01-23 ASSESSMENT — PATIENT HEALTH QUESTIONNAIRE - PHQ9
SUM OF ALL RESPONSES TO PHQ QUESTIONS 1-9: 0
1. LITTLE INTEREST OR PLEASURE IN DOING THINGS: NOT AT ALL
SUM OF ALL RESPONSES TO PHQ QUESTIONS 1-9: 0
2. FEELING DOWN, DEPRESSED OR HOPELESS: NOT AT ALL
SUM OF ALL RESPONSES TO PHQ9 QUESTIONS 1 & 2: 0
SUM OF ALL RESPONSES TO PHQ QUESTIONS 1-9: 0
SUM OF ALL RESPONSES TO PHQ QUESTIONS 1-9: 0

## 2025-01-23 NOTE — PROGRESS NOTES
Department of Obstetrics and Gynecology  Office Visit  Name:  Melida Vazquez   CSN: [id number]    : 2000   Age: 24 y.o.       Chief Complaint   Patient presents with    Other     Patient presents today as 3 month med check. Pt states not taking ocp d/t not being covered by insurance, would like to discuss other options, but took Lysteda and LMP: approx. 2024. Pt wanting pregnancy and STD check c/o fishy odor, urinary pressure and frequency with little output. Negative pregnancy test in office today       SUBJECTIVE:  Melida Vazquez is a 24 y.o.  who presents for menorrhagia with regular cycle as well as vaginal odor and urinary frequency. Has not picked up progestin only pill as it was not covered by insurance. Has taken Lysteda for bleeding. Requesting STD check due to vaginal odor after a new partner around a week ago. Notes urinary frequency and pressure.      GYN HX:    Menses:   Patient's last menstrual period was 2024 (approximate).    Patient's medications, allergies, past medical, surgical, social and family histories were reviewed and updated as appropriate.    Past Medical History:   Diagnosis Date    Anxiety     Asthma     Depression     Dizziness     Dysmenorrhea     Dyspareunia in female     Irregular menses     Menorrhagia     PTSD (post-traumatic stress disorder)     DX AT 13 YEARS NOT TAKING MEDICATIONS    Suicidal behavior with attempted self-injury (HCC)     LAST TIME WAS 4 YEARS AGO updated 25       Past Surgical History:   Procedure Laterality Date    LAPAROSCOPY N/A 2024    LAPAROSCOPY EXPLORATORY; ASPIRATION OF PELVIC FLUID performed by Sultana Alcaraz DO at [facility] OR       Social History     Socioeconomic History    Marital status: Single     Spouse name: Not on file    Number of children: Not on file    Years of education: Not on file    Highest education level: Not on file   Occupational History    Not on file   Tobacco Use    Smoking status: Never

## 2025-01-24 DIAGNOSIS — B37.31 YEAST INFECTION OF THE VAGINA: Primary | ICD-10-CM

## 2025-01-24 DIAGNOSIS — B96.89 BACTERIAL VAGINOSIS: ICD-10-CM

## 2025-01-24 DIAGNOSIS — N76.0 BACTERIAL VAGINOSIS: ICD-10-CM

## 2025-01-24 LAB
B-HCG SERPL EIA 3RD IS-ACNC: <5 MIU/ML
BACTERIA UR CULT: NORMAL
BASOPHILS # BLD: 0 K/UL (ref 0–0.2)
BASOPHILS NFR BLD: 0.4 %
BV BACTERIA DNA VAG QL NAA+PROBE: DETECTED
C GLABRATA DNA VAG QL NAA+PROBE: ABNORMAL
C GLABRATA DNA VAG QL NAA+PROBE: NOT DETECTED
C KRUSEI DNA VAG QL NAA+PROBE: DETECTED
C TRACH DNA CVX QL NAA+PROBE: NEGATIVE
CANDIDA DNA VAG QL NAA+PROBE: DETECTED
DEPRECATED RDW RBC AUTO: 14.7 % (ref 12.4–15.4)
EOSINOPHIL # BLD: 0.1 K/UL (ref 0–0.6)
EOSINOPHIL NFR BLD: 0.8 %
HBV SURFACE AG SERPL QL IA: NORMAL
HCT VFR BLD AUTO: 41.2 % (ref 36–48)
HGB BLD-MCNC: 13.7 G/DL (ref 12–16)
HIV 1+2 AB+HIV1 P24 AG SERPL QL IA: NORMAL
HIV 2 AB SERPL QL IA: NORMAL
HIV1 AB SERPL QL IA: NORMAL
HIV1 P24 AG SERPL QL IA: NORMAL
LYMPHOCYTES # BLD: 1.2 K/UL (ref 1–5.1)
LYMPHOCYTES NFR BLD: 14.1 %
MCH RBC QN AUTO: 30 PG (ref 26–34)
MCHC RBC AUTO-ENTMCNC: 33.2 G/DL (ref 31–36)
MCV RBC AUTO: 90.2 FL (ref 80–100)
MONOCYTES # BLD: 0.5 K/UL (ref 0–1.3)
MONOCYTES NFR BLD: 6.3 %
N GONORRHOEA DNA CERV MUCUS QL NAA+PROBE: NEGATIVE
NEUTROPHILS # BLD: 6.7 K/UL (ref 1.7–7.7)
NEUTROPHILS NFR BLD: 78.4 %
PLATELET # BLD AUTO: 171 K/UL (ref 135–450)
PMV BLD AUTO: 10.4 FL (ref 5–10.5)
PROLACTIN SERPL IA-MCNC: 17 NG/ML
RBC # BLD AUTO: 4.56 M/UL (ref 4–5.2)
REAGIN+T PALLIDUM IGG+IGM SERPL-IMP: REACTIVE
RPR SER QL: NORMAL
T VAGINALIS DNA VAG QL NAA+PROBE: NOT DETECTED
TSH SERPL DL<=0.005 MIU/L-ACNC: 1.35 UIU/ML (ref 0.27–4.2)
WBC # BLD AUTO: 8.6 K/UL (ref 4–11)

## 2025-01-24 RX ORDER — METRONIDAZOLE 500 MG/1
500 TABLET ORAL 2 TIMES DAILY
Qty: 14 TABLET | Refills: 0 | Status: SHIPPED | OUTPATIENT
Start: 2025-01-24 | End: 2025-01-31

## 2025-01-24 RX ORDER — FLUCONAZOLE 150 MG/1
150 TABLET ORAL ONCE
Qty: 1 TABLET | Refills: 0 | Status: SHIPPED | OUTPATIENT
Start: 2025-01-24 | End: 2025-01-24

## 2025-01-29 LAB — T PALLIDUM AB SER QL AGGL: REACTIVE

## 2025-02-04 ENCOUNTER — PROCEDURE VISIT (OUTPATIENT)
Dept: OBGYN | Age: 25
End: 2025-02-04
Payer: COMMERCIAL

## 2025-02-04 VITALS
SYSTOLIC BLOOD PRESSURE: 108 MMHG | HEIGHT: 64 IN | BODY MASS INDEX: 18.44 KG/M2 | DIASTOLIC BLOOD PRESSURE: 70 MMHG | WEIGHT: 108 LBS

## 2025-02-04 DIAGNOSIS — Z30.017 INSERTION OF NEXPLANON: ICD-10-CM

## 2025-02-04 DIAGNOSIS — N94.6 DYSMENORRHEA: ICD-10-CM

## 2025-02-04 DIAGNOSIS — A53.9 SYPHILIS: ICD-10-CM

## 2025-02-04 DIAGNOSIS — N92.1 MENORRHAGIA WITH IRREGULAR CYCLE: Primary | ICD-10-CM

## 2025-02-04 PROCEDURE — 11981 INSERTION DRUG DLVR IMPLANT: CPT | Performed by: STUDENT IN AN ORGANIZED HEALTH CARE EDUCATION/TRAINING PROGRAM

## 2025-02-04 PROCEDURE — 99213 OFFICE O/P EST LOW 20 MIN: CPT | Performed by: STUDENT IN AN ORGANIZED HEALTH CARE EDUCATION/TRAINING PROGRAM

## 2025-02-04 PROCEDURE — 96372 THER/PROPH/DIAG INJ SC/IM: CPT | Performed by: STUDENT IN AN ORGANIZED HEALTH CARE EDUCATION/TRAINING PROGRAM

## 2025-02-04 NOTE — PROGRESS NOTES
Department of Obstetrics and Gynecology  Nexplanon Insertion Office Visit  Name:  Melida Vazquez   CSN: [id number]    : 2000   Age: 24 y.o.        Chief Complaint   Patient presents with    Procedure     Nexplanon insertion, consent signed.     Injections     Bicillin injection given next 2 injections scheduled for  and        SUBJECTIVE:  Melida Vazquez is a 24 y.o.  who returns for Nexplanon insertion as well as bicillin for syphilis.     GYN HX:    Menses:   Patient's last menstrual period was 2024 (approximate).    Patient's medications, allergies, past medical, surgical, social and family histories were reviewed and updated as appropriate.    ROS:  Constitutional: negative for fever, chills, unexplained weight loss  Genitourinary: negative for dysuria, hematuria, trouble voiding, or incontinence; negative for vaginal discharge, itching, odor, or lesions    OBJECTIVE:   /70 (Site: Left Upper Arm, Position: Sitting, Cuff Size: Large Adult)   Ht 1.626 m (5' 4\")   Wt 49 kg (108 lb)   LMP 2024 (Approximate)   BMI 18.54 kg/m²     Constitutional: healthy appearing, well nourished, well developed  Skin: no rashes, no lesions  RESP: good respiratory effort  EXT: normal, atraumatic, no cyanosis or edema    Chaperone: Ilana Bradley MA present for exam    Nexplanon Insertion:  The left arm was prepped with betadine 8 cm above the medial epicondyle.  2 cc of 1% lidocaine was injected.  The Nexplanon was inserted easily.  No complications.  The procedure was tolerated well.  A steri-strip bandage was placed over the insertion site.           A/P:   Diagnosis Orders   1. Menorrhagia with irregular cycle  etonogestrel (NEXPLANON) implant 68 mg      2. Dysmenorrhea  etonogestrel (NEXPLANON) implant 68 mg      3. Syphilis  penicillin G benzathine (BICILLIN L-A) [id number] UNIT/4ML IM suspension 2.4 Million Units        - Menorrhagia with irregular menses and dysmenorrhea, pelvic

## 2025-02-04 NOTE — PROGRESS NOTES
Administrations This Visit       etonogestrel (NEXPLANON) implant 68 mg       Admin Date  02/04/2025  14:16 Action  Given Dose  68 mg Route  Subdermal Site  Arm Left Documented By  Ilana Bradley MA    NDC: 06766-617-98    Lot#: [id number]    : Coubic    Patient Supplied?: No              penicillin G benzathine (BICILLIN L-A) [id number] UNIT/4ML IM suspension 2.4 Million Units       Admin Date  02/04/2025  14:18 Action  Given Dose  2.4 Million Units Route  IntraMUSCular Site  Other Documented By  Ilana Bradley MA    NDC: 98582-836-23    Lot#: [id number]    : [employer]    Patient Supplied?: Yes    Comments: RAYMOND Glut

## 2025-02-11 ENCOUNTER — NURSE ONLY (OUTPATIENT)
Dept: OBGYN | Age: 25
End: 2025-02-11
Payer: COMMERCIAL

## 2025-02-11 VITALS
BODY MASS INDEX: 18.44 KG/M2 | SYSTOLIC BLOOD PRESSURE: 118 MMHG | DIASTOLIC BLOOD PRESSURE: 70 MMHG | WEIGHT: 108 LBS | HEIGHT: 64 IN

## 2025-02-11 DIAGNOSIS — A53.9 SYPHILIS: Primary | ICD-10-CM

## 2025-02-11 PROCEDURE — 96372 THER/PROPH/DIAG INJ SC/IM: CPT | Performed by: STUDENT IN AN ORGANIZED HEALTH CARE EDUCATION/TRAINING PROGRAM

## 2025-02-11 NOTE — PROGRESS NOTES
Melida Vazquez  2000    Chief Complaint   Patient presents with    Injections     Bicillin injection given       Administrations This Visit       penicillin G benzathine (BICILLIN L-A) [id number] UNIT/4ML IM suspension 2.4 Million Units       Admin Date  02/11/2025  14:45 Action  Given Dose  2.4 Million Units Route  IntraMUSCular Site  Other Documented By  Ilana Bradley MA    NDC: 98461-815-79    Lot#: [id number]    : [employer]    Patient Supplied?: Yes    Comments: RUOQ Glut                      ASSESSMENT AND PLAN   Diagnosis Orders   1. Syphilis  penicillin G benzathine (BICILLIN L-A) [id number] UNIT/4ML IM suspension 2.4 Million Units            Return in about 1 week (around 2/18/2025).

## 2025-02-14 ENCOUNTER — TELEPHONE (OUTPATIENT)
Dept: OBGYN | Age: 25
End: 2025-02-14

## 2025-02-14 NOTE — TELEPHONE ENCOUNTER
Pt called stating that she was on her menses when she had the nexplanon inserted 2/4/2025. Pt states bleeding stopped and another cycle has started. I informed the pt that it takes time to regulate. Pt also c/o worsening cramps since the insertion. What do you recommend?

## 2025-02-18 ENCOUNTER — TELEPHONE (OUTPATIENT)
Dept: OBGYN | Age: 25
End: 2025-02-18

## 2025-02-18 ENCOUNTER — NURSE ONLY (OUTPATIENT)
Dept: OBGYN | Age: 25
End: 2025-02-18
Payer: COMMERCIAL

## 2025-02-18 VITALS
WEIGHT: 110 LBS | SYSTOLIC BLOOD PRESSURE: 115 MMHG | HEIGHT: 64 IN | DIASTOLIC BLOOD PRESSURE: 72 MMHG | BODY MASS INDEX: 18.78 KG/M2

## 2025-02-18 DIAGNOSIS — A53.9 SYPHILIS: Primary | ICD-10-CM

## 2025-02-18 PROCEDURE — 96372 THER/PROPH/DIAG INJ SC/IM: CPT | Performed by: STUDENT IN AN ORGANIZED HEALTH CARE EDUCATION/TRAINING PROGRAM

## 2025-02-18 NOTE — TELEPHONE ENCOUNTER
Pt has nurse visit today for bicillin  I scheduled her an appt tomorrow in [place] to discuss extending her fmla.  She wants a note excusing her for   2/14,2/15,2/17, and 2/18/25 due to her cramping.  She wants to  a note today if that's okay.

## 2025-02-18 NOTE — PROGRESS NOTES
Melida Vazquez  2000    Chief Complaint   Patient presents with    Injections     Bicillin given today      Administrations This Visit       penicillin G benzathine (BICILLIN L-A) [id number] UNIT/4ML IM suspension 2.4 Million Units       Admin Date  02/18/2025  13:37 Action  Given Dose  2.4 Million Units Route  IntraMUSCular Site  Other Documented By  Ilana Bradley MA    NDC: 43019-355-35    Lot#: [id number]    : [employer]    Patient Supplied?: Yes    Comments: JADE Glut                      ASSESSMENT AND PLAN   Diagnosis Orders   1. Syphilis  penicillin G benzathine (BICILLIN L-A) [id number] UNIT/4ML IM suspension 2.4 Million Units            No follow-ups on file.

## 2025-02-19 ENCOUNTER — OFFICE VISIT (OUTPATIENT)
Age: 25
End: 2025-02-19
Payer: COMMERCIAL

## 2025-02-19 VITALS
SYSTOLIC BLOOD PRESSURE: 118 MMHG | BODY MASS INDEX: 19.12 KG/M2 | HEART RATE: 90 BPM | HEIGHT: 64 IN | DIASTOLIC BLOOD PRESSURE: 75 MMHG | WEIGHT: 112 LBS

## 2025-02-19 DIAGNOSIS — G89.29 CHRONIC FEMALE PELVIC PAIN: ICD-10-CM

## 2025-02-19 DIAGNOSIS — B00.9 PCR DNA POSITIVE FOR HSV2: ICD-10-CM

## 2025-02-19 DIAGNOSIS — R10.2 CHRONIC FEMALE PELVIC PAIN: ICD-10-CM

## 2025-02-19 DIAGNOSIS — N80.9 ENDOMETRIOSIS DETERMINED BY LAPAROSCOPY: ICD-10-CM

## 2025-02-19 DIAGNOSIS — N92.1 MENORRHAGIA WITH IRREGULAR CYCLE: ICD-10-CM

## 2025-02-19 DIAGNOSIS — N94.6 DYSMENORRHEA: Primary | ICD-10-CM

## 2025-02-19 PROCEDURE — 99214 OFFICE O/P EST MOD 30 MIN: CPT | Performed by: STUDENT IN AN ORGANIZED HEALTH CARE EDUCATION/TRAINING PROGRAM

## 2025-02-19 RX ORDER — VALACYCLOVIR HYDROCHLORIDE 1 G/1
1000 TABLET, FILM COATED ORAL DAILY
Qty: 5 TABLET | Refills: 1 | Status: SHIPPED | OUTPATIENT
Start: 2025-02-19 | End: 2025-02-24

## 2025-02-19 RX ORDER — KETOROLAC TROMETHAMINE 10 MG/1
10 TABLET, FILM COATED ORAL EVERY 6 HOURS PRN
Qty: 20 TABLET | Refills: 2 | Status: SHIPPED | OUTPATIENT
Start: 2025-02-19 | End: 2026-02-19

## 2025-02-19 NOTE — PROGRESS NOTES
Department of Obstetrics and Gynecology  Office Visit  Name:  Melida Vazquez   CSN: [id number]    : 2000   Age: 24 y.o.       Chief Complaint   Patient presents with    Other     Pt presents today to get [facility] paperwork extended. Pt states having to leave work d/t severe cramping and having to call off work today. Pt also wishing to discuss b/c       SUBJECTIVE:  Melida Vazquez is a 24 y.o.  who presents for dysmenorrhea. Had nexplanon placed 2/. Reports cramping that is severe and had to call off work today. Notes the cramping has gotten worse since placement. Has taken off work since Friday. Has tried to alternate tylenol and ibuprofen without relief. Notes her bleeding is not as heavy. States she has     GYN HX:    Menses:   Patient's last menstrual period was 2025 (approximate).    Patient's medications, allergies, past medical, surgical, social and family histories were reviewed and updated as appropriate.    Past Medical History:   Diagnosis Date    Anxiety     Asthma     Depression     Dizziness     Dysmenorrhea     Dyspareunia in female     Endometriosis     Herpes simplex virus (HSV) infection     Irregular menses     Menorrhagia     PTSD (post-traumatic stress disorder)     DX AT 13 YEARS NOT TAKING MEDICATIONS    STD (sexually transmitted disease)     Suicidal behavior with attempted self-injury (HCC)     LAST TIME WAS 4 YEARS AGO updated 24    Trauma        Past Surgical History:   Procedure Laterality Date    LAPAROSCOPY N/A 2024    LAPAROSCOPY EXPLORATORY; ASPIRATION OF PELVIC FLUID performed by Sultana Alcaraz DO at [facility] OR       Social History     Socioeconomic History    Marital status: Single     Spouse name: Not on file    Number of children: Not on file    Years of education: Not on file    Highest education level: Not on file   Occupational History    Not on file   Tobacco Use    Smoking status: Some Days     Current packs/day: 0.00     Types: Cigarettes

## 2025-02-20 ENCOUNTER — TELEPHONE (OUTPATIENT)
Dept: OBGYN | Age: 25
End: 2025-02-20

## 2025-02-20 NOTE — TELEPHONE ENCOUNTER
Pt was seen in our office 2/19/2025 due to severe cramping. Pt states she requested a note to excuse her for her missed work days. Pt states she was sent home today due to her pain. Pt wanted to know if we could extend the note to Friday. Please advise.

## 2025-02-24 ENCOUNTER — APPOINTMENT (OUTPATIENT)
Dept: ULTRASOUND IMAGING | Age: 25
End: 2025-02-24
Payer: COMMERCIAL

## 2025-02-24 ENCOUNTER — TELEPHONE (OUTPATIENT)
Dept: OBGYN | Age: 25
End: 2025-02-24

## 2025-02-24 ENCOUNTER — HOSPITAL ENCOUNTER (EMERGENCY)
Age: 25
Discharge: HOME | End: 2025-02-24
Payer: COMMERCIAL

## 2025-02-24 VITALS
RESPIRATION RATE: 16 BRPM | SYSTOLIC BLOOD PRESSURE: 128 MMHG | DIASTOLIC BLOOD PRESSURE: 77 MMHG | HEART RATE: 100 BPM | OXYGEN SATURATION: 100 % | TEMPERATURE: 97.8 F

## 2025-02-24 DIAGNOSIS — R10.2 PELVIC PAIN IN FEMALE: Primary | ICD-10-CM

## 2025-02-24 DIAGNOSIS — N83.202 BILATERAL OVARIAN CYSTS: ICD-10-CM

## 2025-02-24 DIAGNOSIS — N83.201 BILATERAL OVARIAN CYSTS: ICD-10-CM

## 2025-02-24 DIAGNOSIS — N93.8 DUB (DYSFUNCTIONAL UTERINE BLEEDING): ICD-10-CM

## 2025-02-24 LAB
ANION GAP SERPL CALCULATED.3IONS-SCNC: 12 MMOL/L (ref 9–17)
BACTERIA URNS QL MICRO: ABNORMAL
BASOPHILS # BLD: 0.05 K/UL
BASOPHILS NFR BLD: 1 % (ref 0–1)
BILIRUB UR QL STRIP: NEGATIVE
BUN SERPL-MCNC: 14 MG/DL (ref 7–20)
CALCIUM SERPL-MCNC: 10.3 MG/DL (ref 8.3–10.6)
CHLORIDE SERPL-SCNC: 105 MMOL/L (ref 99–110)
CLARITY UR: CLEAR
CO2 SERPL-SCNC: 25 MMOL/L (ref 21–32)
COLOR UR: YELLOW
CREAT SERPL-MCNC: 0.8 MG/DL (ref 0.6–1.1)
CRYSTALS URNS MICRO: ABNORMAL /HPF
EOSINOPHIL # BLD: 0.13 K/UL
EOSINOPHILS RELATIVE PERCENT: 2 % (ref 0–3)
EPI CELLS #/AREA URNS HPF: 4 /HPF
ERYTHROCYTE [DISTWIDTH] IN BLOOD BY AUTOMATED COUNT: 13.6 % (ref 11.7–14.9)
GFR, ESTIMATED: >90 ML/MIN/1.73M2
GLUCOSE SERPL-MCNC: 88 MG/DL (ref 74–99)
GLUCOSE UR STRIP-MCNC: NEGATIVE MG/DL
HCG UR QL: NEGATIVE
HCT VFR BLD AUTO: 45.8 % (ref 37–47)
HGB BLD-MCNC: 14.4 G/DL (ref 12.5–16)
HGB UR QL STRIP.AUTO: ABNORMAL
IMM GRANULOCYTES # BLD AUTO: 0.02 K/UL
IMM GRANULOCYTES NFR BLD: 0 %
KETONES UR STRIP-MCNC: NEGATIVE MG/DL
LEUKOCYTE ESTERASE UR QL STRIP: NEGATIVE
LYMPHOCYTES NFR BLD: 1.72 K/UL
LYMPHOCYTES RELATIVE PERCENT: 24 % (ref 24–44)
MCH RBC QN AUTO: 29.5 PG (ref 27–31)
MCHC RBC AUTO-ENTMCNC: 31.4 G/DL (ref 32–36)
MCV RBC AUTO: 93.9 FL (ref 78–100)
MONOCYTES NFR BLD: 0.49 K/UL
MONOCYTES NFR BLD: 7 % (ref 0–4)
NEUTROPHILS NFR BLD: 67 % (ref 36–66)
NEUTS SEG NFR BLD: 4.92 K/UL
NITRITE UR QL STRIP: NEGATIVE
PH UR STRIP: 7.5 [PH] (ref 5–8)
PLATELET # BLD AUTO: 242 K/UL (ref 140–440)
PMV BLD AUTO: 11 FL (ref 7.5–11.1)
POTASSIUM SERPL-SCNC: 4.6 MMOL/L (ref 3.5–5.1)
PROT UR STRIP-MCNC: NEGATIVE MG/DL
RBC # BLD AUTO: 4.88 M/UL (ref 4.2–5.4)
RBC #/AREA URNS HPF: 0 /HPF (ref 0–2)
SODIUM SERPL-SCNC: 141 MMOL/L (ref 136–145)
SP GR UR STRIP: 1.02 (ref 1–1.03)
UROBILINOGEN UR STRIP-ACNC: 0.2 EU/DL (ref 0–1)
WBC #/AREA URNS HPF: 0 /HPF (ref 0–5)
WBC OTHER # BLD: 7.3 K/UL (ref 4–10.5)

## 2025-02-24 PROCEDURE — 85025 COMPLETE CBC W/AUTO DIFF WBC: CPT

## 2025-02-24 PROCEDURE — 6360000002 HC RX W HCPCS

## 2025-02-24 PROCEDURE — 84703 CHORIONIC GONADOTROPIN ASSAY: CPT

## 2025-02-24 PROCEDURE — 80048 BASIC METABOLIC PNL TOTAL CA: CPT

## 2025-02-24 PROCEDURE — 93975 VASCULAR STUDY: CPT

## 2025-02-24 PROCEDURE — 76830 TRANSVAGINAL US NON-OB: CPT

## 2025-02-24 PROCEDURE — 99284 EMERGENCY DEPT VISIT MOD MDM: CPT

## 2025-02-24 PROCEDURE — 81001 URINALYSIS AUTO W/SCOPE: CPT

## 2025-02-24 PROCEDURE — 96374 THER/PROPH/DIAG INJ IV PUSH: CPT

## 2025-02-24 RX ORDER — HYDROCODONE BITARTRATE AND ACETAMINOPHEN 5; 325 MG/1; MG/1
1 TABLET ORAL EVERY 6 HOURS PRN
Qty: 12 TABLET | Refills: 0 | Status: SHIPPED | OUTPATIENT
Start: 2025-02-24 | End: 2025-02-27

## 2025-02-24 RX ORDER — KETOROLAC TROMETHAMINE 15 MG/ML
15 INJECTION, SOLUTION INTRAMUSCULAR; INTRAVENOUS ONCE
Status: COMPLETED | OUTPATIENT
Start: 2025-02-24 | End: 2025-02-24

## 2025-02-24 RX ORDER — HYDROCODONE BITARTRATE AND ACETAMINOPHEN 5; 325 MG/1; MG/1
1 TABLET ORAL ONCE
Status: DISCONTINUED | OUTPATIENT
Start: 2025-02-24 | End: 2025-02-24

## 2025-02-24 RX ADMIN — KETOROLAC TROMETHAMINE 15 MG: 15 INJECTION, SOLUTION INTRAMUSCULAR; INTRAVENOUS at 17:41

## 2025-02-24 ASSESSMENT — PAIN DESCRIPTION - DESCRIPTORS: DESCRIPTORS: ACHING

## 2025-02-24 ASSESSMENT — PAIN DESCRIPTION - LOCATION
LOCATION: ABDOMEN
LOCATION: PELVIS

## 2025-02-24 ASSESSMENT — PAIN SCALES - GENERAL
PAINLEVEL_OUTOF10: 8
PAINLEVEL_OUTOF10: 9
PAINLEVEL_OUTOF10: 8

## 2025-02-24 ASSESSMENT — PAIN DESCRIPTION - ORIENTATION: ORIENTATION: RIGHT

## 2025-02-24 NOTE — TELEPHONE ENCOUNTER
Pt calling to request extension on work note stating unable to work d/t pain. Pt also reporting pain meds given at last appt are not helping. Please advise

## 2025-02-24 NOTE — TELEPHONE ENCOUNTER
Pt informed. Pt states she did not go to work Saturday, Sunday or today due to pain and bleeding. Pt would like to know if she can get a note for those 3 days. Pt has u/s 3/4/25. Please advise.

## 2025-02-24 NOTE — ED TRIAGE NOTES
Patient to ED for vaginal bleeding. Patient had the implant placed in left arm 3 weeks ago and has not stopped bleeding since. Patient endorses going through 2 pads an hour x 3 weeks.

## 2025-02-24 NOTE — ED PROVIDER NOTES
[facility] EMERGENCY DEPARTMENT  EMERGENCY DEPARTMENT ENCOUNTER        Pt Name: Melida Vazquez  MRN: [id number]  Birthdate 2000  Date of evaluation: 2/24/2025  Provider: ROCCO Augustine CNP  PCP: No primary care provider on file.  Note Started: 4:14 PM EST 2/24/25      FARHEEN. I have evaluated this patient.        CHIEF COMPLAINT       Chief Complaint   Patient presents with    Vaginal Bleeding       HISTORY OF PRESENT ILLNESS: 1 or more Elements     History From: Patient    Limitations to history : None    Social Determinants Significantly Affecting Health : None    Chief Complaint: Abdominal cramping vaginal bleeding for over a month concern for ovarian cyst    Melida Vazquez is a 24 y.o. female history of menorrhagia endometriosis with STD who presents to ED stating she has had a history of endometriosis ovarian cyst and vaginal hemorrhage.  States she has been treated by Dr. Alcaraz in office.  Had to have an ovarian cyst removed surgically.  States for the last month since she had her Nexplanon placed she has been bleeding heavily.  Also reporting she is having significant lower abdominal cramping similar to when she had her ovarian cyst.  States the pain is severe and is keeping her from working.  Denies any fever vomiting diarrhea dysuria or hematuria.  States she was recently treated for syphilis and has no new concerns for STDs at this time.  Denies any vaginal odor or discharge pain with intercourse lesions or concern for STD.  Denies any chest pain shortness of breath or upper respiratory symptoms.    Nursing Notes were all reviewed and agreed with or any disagreements were addressed in the HPI.    REVIEW OF SYSTEMS :      Review of Systems    Positives and Pertinent negatives as per HPI.     SURGICAL HISTORY     Past Surgical History:   Procedure Laterality Date    LAPAROSCOPY N/A 11/18/2024    LAPAROSCOPY EXPLORATORY; ASPIRATION OF PELVIC FLUID performed by Sultana Alcaraz,

## 2025-02-25 NOTE — DISCHARGE INSTRUCTIONS
All ovarian cysts were seen bilaterally in both ovaries, no large ovarian cyst noted.  There was some free fluid in the pelvis could have been from a ruptured ovarian cyst.  Blood flow to both ovaries as well.  No large endometrial stripe noted.  Likely menstrual cycle will reduce to spotting.  Please follow-up with OB/GYN as outpatient.  Return to ED for any worsening symptoms fever increase in pain.  It was my pleasure taking care of you in the emergency department today.  If we prescribed any medications, please be sure to take them as prescribed. Continue taking medications as prescribed by your PCP unless we discussed otherwise.   Please be sure to follow-up with your PCP within the next 1-2 weeks.  Please don't hesitate to return to the emergency department in case of any worsening symptoms.  Wish you a speedy recovery.  Most sincerely,    Tessie Bailey CNP

## 2025-02-27 ENCOUNTER — TELEPHONE (OUTPATIENT)
Dept: OBGYN | Age: 25
End: 2025-02-27

## 2025-02-27 NOTE — TELEPHONE ENCOUNTER
Pt informed. Pt appt moved to the same day as her u/s due to pt stating she is in \"so much pain, I can hardly move\".

## 2025-02-27 NOTE — TELEPHONE ENCOUNTER
Pt called stating that she is unable to work due to her pain and recent cyst dx from the ER. Pt states she was sent home from work again today. Pt requesting a note off of work until her f/u w/ you next Friday. Please advise.

## 2025-03-04 ENCOUNTER — OFFICE VISIT (OUTPATIENT)
Dept: OBGYN | Age: 25
End: 2025-03-04
Payer: COMMERCIAL

## 2025-03-04 VITALS
BODY MASS INDEX: 18.95 KG/M2 | WEIGHT: 111 LBS | HEIGHT: 64 IN | DIASTOLIC BLOOD PRESSURE: 78 MMHG | HEART RATE: 81 BPM | SYSTOLIC BLOOD PRESSURE: 116 MMHG

## 2025-03-04 DIAGNOSIS — G89.29 CHRONIC FEMALE PELVIC PAIN: Primary | ICD-10-CM

## 2025-03-04 DIAGNOSIS — Z97.5 BREAKTHROUGH BLEEDING ON NEXPLANON: ICD-10-CM

## 2025-03-04 DIAGNOSIS — N92.1 BREAKTHROUGH BLEEDING ON NEXPLANON: ICD-10-CM

## 2025-03-04 DIAGNOSIS — R10.2 CHRONIC FEMALE PELVIC PAIN: Primary | ICD-10-CM

## 2025-03-04 DIAGNOSIS — N94.6 DYSMENORRHEA: ICD-10-CM

## 2025-03-04 DIAGNOSIS — R10.30 LOWER ABDOMINAL PAIN: ICD-10-CM

## 2025-03-04 DIAGNOSIS — N80.9 ENDOMETRIOSIS DETERMINED BY LAPAROSCOPY: ICD-10-CM

## 2025-03-04 DIAGNOSIS — N92.1 MENORRHAGIA WITH IRREGULAR CYCLE: ICD-10-CM

## 2025-03-04 PROCEDURE — 99214 OFFICE O/P EST MOD 30 MIN: CPT | Performed by: STUDENT IN AN ORGANIZED HEALTH CARE EDUCATION/TRAINING PROGRAM

## 2025-03-04 NOTE — PROGRESS NOTES
Department of Obstetrics and Gynecology  Office Visit  Name:  Melida Vazquez   CSN: [id number]    : 2000   Age: 24 y.o.       Chief Complaint   Patient presents with    Pelvic Pain     Reports abdominal pain. Pain started x3 weeks and has been intermittent . Pain is located: Lower AB and does not radiate. Reports the pain as moderate and stabbing quality.  No aggravating factors.  No alleviating factors.. Not evaluated for symptoms in the past.        Other     Here to discuss US results.        SUBJECTIVE:  Melida Vazquez is a 24 y.o.  who presents for dysmenorrhea. Had nexplanon placed 2/. Reports it has been intermittent for 3 weeks. Lower abdomen and does not radiate. Notes it is moderate and stabbing when it occurs. Nothing makes it worse, nothing makes it better.     GYN HX:    Menses:   Patient's last menstrual period was 2025 (approximate).    Patient's medications, allergies, past medical, surgical, social and family histories were reviewed and updated as appropriate.    Past Medical History:   Diagnosis Date    Anxiety     Asthma     Depression     Dizziness     Dysmenorrhea     Dyspareunia in female     Endometriosis     Herpes simplex virus (HSV) infection     Irregular menses     Menorrhagia     PTSD (post-traumatic stress disorder)     DX AT 13 YEARS NOT TAKING MEDICATIONS    STD (sexually transmitted disease)     Suicidal behavior with attempted self-injury (HCC)     LAST TIME WAS 4 YEARS AGO updated 24    Trauma        Past Surgical History:   Procedure Laterality Date    LAPAROSCOPY N/A 2024    LAPAROSCOPY EXPLORATORY; ASPIRATION OF PELVIC FLUID performed by Sultana Alcaraz DO at [facility] OR       Social History     Socioeconomic History    Marital status: Single     Spouse name: Not on file    Number of children: Not on file    Years of education: Not on file    Highest education level: Not on file   Occupational History    Not on file   Tobacco Use    Smoking

## 2025-03-05 ENCOUNTER — TELEPHONE (OUTPATIENT)
Dept: OBGYN | Age: 25
End: 2025-03-05

## 2025-03-05 ENCOUNTER — OFFICE VISIT (OUTPATIENT)
Age: 25
End: 2025-03-05
Payer: COMMERCIAL

## 2025-03-05 ENCOUNTER — PREP FOR PROCEDURE (OUTPATIENT)
Dept: OBGYN | Age: 25
End: 2025-03-05

## 2025-03-05 VITALS — BODY MASS INDEX: 19.05 KG/M2 | DIASTOLIC BLOOD PRESSURE: 66 MMHG | WEIGHT: 111 LBS | SYSTOLIC BLOOD PRESSURE: 111 MMHG

## 2025-03-05 DIAGNOSIS — N94.6 DYSMENORRHEA: ICD-10-CM

## 2025-03-05 DIAGNOSIS — R10.30 LOWER ABDOMINAL PAIN: Primary | ICD-10-CM

## 2025-03-05 DIAGNOSIS — Z97.5 BREAKTHROUGH BLEEDING ON NEXPLANON: ICD-10-CM

## 2025-03-05 DIAGNOSIS — N92.1 MENORRHAGIA WITH IRREGULAR CYCLE: ICD-10-CM

## 2025-03-05 DIAGNOSIS — R10.2 CHRONIC FEMALE PELVIC PAIN: ICD-10-CM

## 2025-03-05 DIAGNOSIS — N92.1 BREAKTHROUGH BLEEDING ON NEXPLANON: ICD-10-CM

## 2025-03-05 DIAGNOSIS — G89.29 CHRONIC FEMALE PELVIC PAIN: ICD-10-CM

## 2025-03-05 DIAGNOSIS — N80.9 ENDOMETRIOSIS DETERMINED BY LAPAROSCOPY: ICD-10-CM

## 2025-03-05 PROCEDURE — 99214 OFFICE O/P EST MOD 30 MIN: CPT | Performed by: STUDENT IN AN ORGANIZED HEALTH CARE EDUCATION/TRAINING PROGRAM

## 2025-03-05 RX ORDER — ELAGOLIX 150 MG/1
1 TABLET, FILM COATED ORAL DAILY
Qty: 30 TABLET | Refills: 11 | Status: SHIPPED | OUTPATIENT
Start: 2025-03-05

## 2025-03-05 RX ORDER — IBUPROFEN 800 MG/1
800 TABLET, FILM COATED ORAL EVERY 6 HOURS PRN
Qty: 30 TABLET | Refills: 1 | Status: SHIPPED | OUTPATIENT
Start: 2025-03-05

## 2025-03-05 NOTE — PROGRESS NOTES
Per [place] online, no prior [facility] [id number]  See screen print in media tab  Ready to schedule

## 2025-03-05 NOTE — PROGRESS NOTES
Department of Obstetrics and Gynecology  Office Visit  Name:  Melida Vazquez   CSN: [id number]    : 2000   Age: 24 y.o.       Chief Complaint   Patient presents with    Pelvic Pain     Reports life altering pelvic pain        SUBJECTIVE:  Melida Vazquez is a 24 y.o.  who presents for dysmenorrhea. Had nexplanon placed 2/. Reports it has been intermittent for 3 weeks. Lower abdomen and does not radiate. Notes it is moderate and stabbing when it occurs. Nothing makes it worse, nothing makes it better. Patient reports she was sent home again from work today due to cramping. Reports she tried the toradol and this did not help. 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     GYN HX:    Menses:   Patient's last menstrual period was 2025 (approximate).    Patient's medications, allergies, past medical, surgical, social and family histories were reviewed and updated as appropriate.    Past Medical History:   Diagnosis Date    Anxiety     Asthma     Depression     Dizziness     Dysmenorrhea     Dyspareunia in female     Endometriosis     Herpes simplex virus (HSV) infection     Irregular menses     Menorrhagia     PTSD (post-traumatic stress disorder)     DX AT 13 YEARS NOT TAKING MEDICATIONS    STD (sexually transmitted disease)     Suicidal behavior with attempted self-injury (HCC)     LAST TIME WAS 4 YEARS AGO updated 25    Trauma        Past Surgical History:   Procedure Laterality Date    LAPAROSCOPY N/A 2024    LAPAROSCOPY EXPLORATORY; ASPIRATION OF PELVIC FLUID performed by Sultana Alcaraz DO at [facility] OR       Social History     Socioeconomic History    Marital status: Single     Spouse name: Not on file    Number of children: Not on file    Years of education: Not on file    Highest education level: Not on file   Occupational History    Not on file   Tobacco Use    Smoking status: Some Days     Types: Cigarettes    Smokeless tobacco: Never

## 2025-03-05 NOTE — TELEPHONE ENCOUNTER
Pt insists a message be sent to you regarding being off work until surgery.  Pt was informed of all messages sent to you previously with your responses.    Pt was sent home again today after an hour of work due to pelvic pain.  Please advise.

## 2025-03-10 ENCOUNTER — TELEPHONE (OUTPATIENT)
Dept: OBGYN | Age: 25
End: 2025-03-10

## 2025-03-10 NOTE — TELEPHONE ENCOUNTER
Patient is scheduled for surgery with Dr. Alcaraz 3/21/25, patient called stating she would like to reschedule her surgery to a later date, per Dr. Alcaraz she is recommending patient keep surgery date due to pain and symptoms patient is having. Informed patient of Dr. Alcaraz's recommendations.

## 2025-03-10 NOTE — PROGRESS NOTES
RICKI Hsieh- with my call-back # concerning surgery @ [facility] on 3/21/25.  Please call the PAT Nurse for a phone assessment and surgery instructions.

## 2025-03-11 ENCOUNTER — OFFICE VISIT (OUTPATIENT)
Dept: OBGYN | Age: 25
End: 2025-03-11
Payer: COMMERCIAL

## 2025-03-11 VITALS
HEIGHT: 64 IN | HEART RATE: 85 BPM | BODY MASS INDEX: 18.78 KG/M2 | SYSTOLIC BLOOD PRESSURE: 120 MMHG | WEIGHT: 110 LBS | DIASTOLIC BLOOD PRESSURE: 73 MMHG

## 2025-03-11 DIAGNOSIS — G89.29 CHRONIC FEMALE PELVIC PAIN: Primary | ICD-10-CM

## 2025-03-11 DIAGNOSIS — N92.1 MENORRHAGIA WITH IRREGULAR CYCLE: ICD-10-CM

## 2025-03-11 DIAGNOSIS — N94.6 DYSMENORRHEA: ICD-10-CM

## 2025-03-11 DIAGNOSIS — Z30.46 NEXPLANON REMOVAL: ICD-10-CM

## 2025-03-11 DIAGNOSIS — Z97.5 BREAKTHROUGH BLEEDING ON NEXPLANON: ICD-10-CM

## 2025-03-11 DIAGNOSIS — N80.9 ENDOMETRIOSIS DETERMINED BY LAPAROSCOPY: ICD-10-CM

## 2025-03-11 DIAGNOSIS — N92.1 BREAKTHROUGH BLEEDING ON NEXPLANON: ICD-10-CM

## 2025-03-11 DIAGNOSIS — R10.2 CHRONIC FEMALE PELVIC PAIN: Primary | ICD-10-CM

## 2025-03-11 PROCEDURE — 99214 OFFICE O/P EST MOD 30 MIN: CPT | Performed by: STUDENT IN AN ORGANIZED HEALTH CARE EDUCATION/TRAINING PROGRAM

## 2025-03-11 PROCEDURE — 11982 REMOVE DRUG IMPLANT DEVICE: CPT | Performed by: STUDENT IN AN ORGANIZED HEALTH CARE EDUCATION/TRAINING PROGRAM

## 2025-03-11 NOTE — PROGRESS NOTES
Department of Obstetrics and Gynecology  Office Visit  Name:  Melida Vazquez   CSN: [id number]    : 2000   Age: 24 y.o.       Chief Complaint   Patient presents with    Other     Patient presents today to discuss options d/t pelvic pain and endometrosis       SUBJECTIVE:  Melida Vazquez is a 24 y.o.  who presents for dysmenorrhea. Had nexplanon placed 2/4. Reports it has been intermittent for 3 weeks. Lower abdomen and does not radiate. Notes it is moderate and stabbing when it occurs. Nothing makes it worse, nothing makes it better. Patient reports she was sent home again from work today due to cramping. Reports she tried the toradol and this did not help. 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     GYN HX:    Menses:   Patient's last menstrual period was 2025 (approximate).    Patient's medications, allergies, past medical, surgical, social and family histories were reviewed and updated as appropriate.    Past Medical History:   Diagnosis Date    Anxiety     Asthma     Depression     Dizziness     Dysmenorrhea     Dyspareunia in female     Endometriosis     Herpes simplex virus (HSV) infection     Irregular menses     Menorrhagia     PTSD (post-traumatic stress disorder)     DX AT 13 YEARS NOT TAKING MEDICATIONS    STD (sexually transmitted disease)     Suicidal behavior with attempted self-injury (HCC)     LAST TIME WAS 4 YEARS AGO updated 25    Trauma        Past Surgical History:   Procedure Laterality Date    LAPAROSCOPY N/A 2024    LAPAROSCOPY EXPLORATORY; ASPIRATION OF PELVIC FLUID performed by Sultana Alcaraz DO at [facility] OR       Social History     Socioeconomic History    Marital status: Single     Spouse name: Not on file    Number of children: Not on file    Years of education: Not on file    Highest education level: Not on file   Occupational History    Not on file   Tobacco Use    Smoking status: Some Days     Types: Cigarettes

## 2025-03-17 NOTE — H&P
Department of Obstetrics and Gynecology  Preoperative History and Physical  Name:  Melida Vazquez   CSN: [id number]   Attending Provider: Sultana Alcaraz DO  Location:  OR/NONE   : 2000   Age: 24 y.o.    Subjective:    Chief Complaint:  Pelvic pain, endometriosis    HPI:  Pt is a 24 y.o. here today for scheduled Procedure(s):  LAPAROSCOPY ROBOTIC WITH EXCISION OF ENDOMETRIOSIS for complaint of pelvic pain and endometriosis. Patient with chronic pelvic pain and had stage I endometriosis noted on laparoscopy 25 with superficial implant noted on left uterosacral ligament. Most recent US on 3/4/25 was normal. Has tried Depo, OCP(mood swings), Paragard IUD(pain), nexplanon(irregular and heavy bleeding). Had discussed multiple methods with patient including progestin only pill which had previously been sent to pharmacy, but she did not take this. Also discussed Liletta IUD as well as Orilissa which patient had previously declined. Patient had nexplanon removed 3/11 due to heavy and irregular bleeding. At that time she desired to try Orilissa as well as surgical excision of endometriosis. She presents today for surgical intervention.     Primary Care Physician:  No primary care provider on file.    No Known Allergies    Current Facility-Administered Medications   Medication Dose Route Frequency Provider Last Rate Last Admin    lactated ringers infusion   IntraVENous Continuous Paresh Cabrera MD 50 mL/hr at 25 1032 New Bag at 25 1032    0.9 % sodium chloride infusion   IntraVENous Continuous Sultana Alcaraz, DO        sodium chloride flush 0.9 % injection 5-40 mL  5-40 mL IntraVENous 2 times per day Sultana Alcaraz, DO        sodium chloride flush 0.9 % injection 5-40 mL  5-40 mL IntraVENous PRN Sultana Alcaraz, DO        0.9 % sodium chloride infusion   IntraVENous PRN Sultana Alcaraz,         sodium chloride flush 0.9 % injection 5-40 mL  5-40 mL IntraVENous 2 times per day Rah Mtz MD

## 2025-03-17 NOTE — PROGRESS NOTES
Surgery @ [facility] on 3/21/25 you will be called 3/20/25 with times    NOTHING TO EAT OR DRINK AFTER MIDNIGHT DAY OF SURGERY    1. Enter thru the hospital main entrance on day of surgery, check in at the Information Desk. If you arrive prior to 6:00am, enter thru the ER entrance.    2. Follow the directions as prescribed by the doctor for your procedure and medications.         Morning of surgery take: Bring Inhaler with you         Stop vitamins, supplements and NSAIDS:  NOW    3. Check with your Doctor regarding stopping blood thinners and follow their instructions.    4. Do not smoke, vape or use chewing tobacco morning of surgery. Do not drink any alcoholic beverages 24 hours prior to surgery.       This includes NA Beer. No street drugs 7 days prior to surgery.    5. If you have dentures, contacts of glasses they will be removed before going to the OR; please bring a case.    6. Please bring picture ID, insurance card, paperwork from the doctor’s office (H & P, Consent, & card for implantable devices).    7. Take a shower with an antibacterial soap the night before surgery and the morning of surgery. Do not put anything on your skin      After your morning shower.    8. You will need a responsible adult to drive you home and check on you after surgery.

## 2025-03-20 ENCOUNTER — ANESTHESIA EVENT (OUTPATIENT)
Dept: OPERATING ROOM | Age: 25
End: 2025-03-20
Payer: COMMERCIAL

## 2025-03-20 ASSESSMENT — LIFESTYLE VARIABLES: SMOKING_STATUS: 1

## 2025-03-20 NOTE — ANESTHESIA PRE PROCEDURE
PROTIME 14.7 08/08/2024 08:45 AM    INR 1.1 08/08/2024 08:45 AM       HCG (If Applicable):   Lab Results   Component Value Date    PREGTESTUR NEGATIVE 02/24/2025    PREGSERUM NEGATIVE 11/04/2021    HCGQUANT <5.0 01/23/2025        ABGs: No results found for: \"PHART\", \"PO2ART\", \"VRG2NBE\", \"MTE1UXK\", \"BEART\", \"D5ZDRIRT\"     Type & Screen (If Applicable):  Lab Results   Component Value Date    ABORH O POSITIVE 08/08/2024    LABANTI NEGATIVE 08/08/2024       Drug/Infectious Status (If Applicable):  Lab Results   Component Value Date/Time    HIV Non-Reactive 01/23/2025 04:12 PM       COVID-19 Screening (If Applicable):   Lab Results   Component Value Date/Time    COVID19 NOT DETECTED 11/04/2021 11:14 PM           Anesthesia Evaluation  Patient summary reviewed   history of anesthetic complications: PONV.  Airway: Mallampati: I  TM distance: >3 FB   Neck ROM: full  Mouth opening: > = 3 FB   Dental: normal exam         Pulmonary:normal exam    (+)           asthma: current smoker                          ROS comment: Smoke Marijuana   Cardiovascular:                      Neuro/Psych:   (+) psychiatric history:            GI/Hepatic/Renal:             Endo/Other:                      ROS comment: endometriosis Abdominal: normal exam            Vascular:          Other Findings:       Anesthesia Plan      general     ASA 2     (Preop chartreview only. )  Induction: intravenous.      Anesthetic plan and risks discussed with patient.    Use of blood products discussed with patient whom consented to blood products.    Plan discussed with CRNA.    Attending anesthesiologist reviewed and agrees with Preprocedure content            ROCCO Veras - CRNA   3/20/2025

## 2025-03-20 NOTE — PROGRESS NOTES
Notified patient surgery @ [facility] on  3/21/25 @ 1215, arrival 1015. NPO status and morning medications reviewed. Understanding verbalized.

## 2025-03-21 ENCOUNTER — HOSPITAL ENCOUNTER (OUTPATIENT)
Age: 25
Setting detail: OUTPATIENT SURGERY
Discharge: HOME | End: 2025-03-21
Attending: STUDENT IN AN ORGANIZED HEALTH CARE EDUCATION/TRAINING PROGRAM | Admitting: STUDENT IN AN ORGANIZED HEALTH CARE EDUCATION/TRAINING PROGRAM
Payer: COMMERCIAL

## 2025-03-21 ENCOUNTER — ANESTHESIA (OUTPATIENT)
Dept: OPERATING ROOM | Age: 25
End: 2025-03-21
Payer: COMMERCIAL

## 2025-03-21 VITALS
SYSTOLIC BLOOD PRESSURE: 106 MMHG | WEIGHT: 108 LBS | OXYGEN SATURATION: 98 % | HEIGHT: 64 IN | BODY MASS INDEX: 18.44 KG/M2 | DIASTOLIC BLOOD PRESSURE: 71 MMHG | TEMPERATURE: 97.7 F | RESPIRATION RATE: 16 BRPM | HEART RATE: 74 BPM

## 2025-03-21 DIAGNOSIS — R10.2 PELVIC PAIN IN FEMALE: ICD-10-CM

## 2025-03-21 DIAGNOSIS — G89.18 POSTOPERATIVE PAIN: Primary | ICD-10-CM

## 2025-03-21 DIAGNOSIS — N80.9 ENDOMETRIOSIS DETERMINED BY LAPAROSCOPY: ICD-10-CM

## 2025-03-21 DIAGNOSIS — R10.2 CHRONIC FEMALE PELVIC PAIN: ICD-10-CM

## 2025-03-21 DIAGNOSIS — G89.29 CHRONIC FEMALE PELVIC PAIN: ICD-10-CM

## 2025-03-21 PROCEDURE — C1765 ADHESION BARRIER: HCPCS | Performed by: STUDENT IN AN ORGANIZED HEALTH CARE EDUCATION/TRAINING PROGRAM

## 2025-03-21 PROCEDURE — 2709999900 HC NON-CHARGEABLE SUPPLY: Performed by: STUDENT IN AN ORGANIZED HEALTH CARE EDUCATION/TRAINING PROGRAM

## 2025-03-21 PROCEDURE — 7100000010 HC PHASE II RECOVERY - FIRST 15 MIN: Performed by: STUDENT IN AN ORGANIZED HEALTH CARE EDUCATION/TRAINING PROGRAM

## 2025-03-21 PROCEDURE — 7100000000 HC PACU RECOVERY - FIRST 15 MIN: Performed by: STUDENT IN AN ORGANIZED HEALTH CARE EDUCATION/TRAINING PROGRAM

## 2025-03-21 PROCEDURE — 6370000000 HC RX 637 (ALT 250 FOR IP): Performed by: STUDENT IN AN ORGANIZED HEALTH CARE EDUCATION/TRAINING PROGRAM

## 2025-03-21 PROCEDURE — 2500000003 HC RX 250 WO HCPCS

## 2025-03-21 PROCEDURE — 6360000002 HC RX W HCPCS

## 2025-03-21 PROCEDURE — 2580000003 HC RX 258

## 2025-03-21 PROCEDURE — 6360000002 HC RX W HCPCS: Performed by: STUDENT IN AN ORGANIZED HEALTH CARE EDUCATION/TRAINING PROGRAM

## 2025-03-21 PROCEDURE — 58662 LAPAROSCOPY EXCISE LESIONS: CPT | Performed by: STUDENT IN AN ORGANIZED HEALTH CARE EDUCATION/TRAINING PROGRAM

## 2025-03-21 PROCEDURE — S2900 ROBOTIC SURGICAL SYSTEM: HCPCS | Performed by: STUDENT IN AN ORGANIZED HEALTH CARE EDUCATION/TRAINING PROGRAM

## 2025-03-21 PROCEDURE — 7100000001 HC PACU RECOVERY - ADDTL 15 MIN: Performed by: STUDENT IN AN ORGANIZED HEALTH CARE EDUCATION/TRAINING PROGRAM

## 2025-03-21 PROCEDURE — 3700000000 HC ANESTHESIA ATTENDED CARE: Performed by: STUDENT IN AN ORGANIZED HEALTH CARE EDUCATION/TRAINING PROGRAM

## 2025-03-21 PROCEDURE — 3600000009 HC SURGERY ROBOT BASE: Performed by: STUDENT IN AN ORGANIZED HEALTH CARE EDUCATION/TRAINING PROGRAM

## 2025-03-21 PROCEDURE — 2580000003 HC RX 258: Performed by: ANESTHESIOLOGY

## 2025-03-21 PROCEDURE — 3700000001 HC ADD 15 MINUTES (ANESTHESIA): Performed by: STUDENT IN AN ORGANIZED HEALTH CARE EDUCATION/TRAINING PROGRAM

## 2025-03-21 PROCEDURE — 2500000003 HC RX 250 WO HCPCS: Performed by: STUDENT IN AN ORGANIZED HEALTH CARE EDUCATION/TRAINING PROGRAM

## 2025-03-21 PROCEDURE — 88305 TISSUE EXAM BY PATHOLOGIST: CPT | Performed by: PATHOLOGY

## 2025-03-21 PROCEDURE — 3600000019 HC SURGERY ROBOT ADDTL 15MIN: Performed by: STUDENT IN AN ORGANIZED HEALTH CARE EDUCATION/TRAINING PROGRAM

## 2025-03-21 PROCEDURE — 7100000011 HC PHASE II RECOVERY - ADDTL 15 MIN: Performed by: STUDENT IN AN ORGANIZED HEALTH CARE EDUCATION/TRAINING PROGRAM

## 2025-03-21 DEVICE — BARRIER ADH ABSRB GYNECARE INTCEED ST L6XW5IN: Type: IMPLANTABLE DEVICE | Site: PERINEUM | Status: FUNCTIONAL

## 2025-03-21 RX ORDER — SODIUM CHLORIDE 9 MG/ML
INJECTION, SOLUTION INTRAVENOUS PRN
Status: DISCONTINUED | OUTPATIENT
Start: 2025-03-21 | End: 2025-03-21 | Stop reason: HOSPADM

## 2025-03-21 RX ORDER — SODIUM CHLORIDE 0.9 % (FLUSH) 0.9 %
5-40 SYRINGE (ML) INJECTION EVERY 12 HOURS SCHEDULED
Status: DISCONTINUED | OUTPATIENT
Start: 2025-03-21 | End: 2025-03-21 | Stop reason: HOSPADM

## 2025-03-21 RX ORDER — PHENYLEPHRINE HCL IN 0.9% NACL 1 MG/10 ML
SYRINGE (ML) INTRAVENOUS
Status: DISCONTINUED | OUTPATIENT
Start: 2025-03-21 | End: 2025-03-21 | Stop reason: SDUPTHER

## 2025-03-21 RX ORDER — FENTANYL CITRATE 50 UG/ML
INJECTION, SOLUTION INTRAMUSCULAR; INTRAVENOUS
Status: DISCONTINUED | OUTPATIENT
Start: 2025-03-21 | End: 2025-03-21 | Stop reason: SDUPTHER

## 2025-03-21 RX ORDER — SODIUM CHLORIDE 9 MG/ML
INJECTION, SOLUTION INTRAVENOUS CONTINUOUS
Status: DISCONTINUED | OUTPATIENT
Start: 2025-03-21 | End: 2025-03-21 | Stop reason: HOSPADM

## 2025-03-21 RX ORDER — LABETALOL HYDROCHLORIDE 5 MG/ML
10 INJECTION, SOLUTION INTRAVENOUS
Status: DISCONTINUED | OUTPATIENT
Start: 2025-03-21 | End: 2025-03-21 | Stop reason: HOSPADM

## 2025-03-21 RX ORDER — PROPOFOL 10 MG/ML
INJECTION, EMULSION INTRAVENOUS
Status: DISCONTINUED | OUTPATIENT
Start: 2025-03-21 | End: 2025-03-21 | Stop reason: SDUPTHER

## 2025-03-21 RX ORDER — ONDANSETRON 2 MG/ML
4 INJECTION INTRAMUSCULAR; INTRAVENOUS
Status: COMPLETED | OUTPATIENT
Start: 2025-03-21 | End: 2025-03-21

## 2025-03-21 RX ORDER — OXYCODONE HYDROCHLORIDE 5 MG/1
5 TABLET ORAL PRN
Status: COMPLETED | OUTPATIENT
Start: 2025-03-21 | End: 2025-03-21

## 2025-03-21 RX ORDER — CELECOXIB 100 MG/1
100 CAPSULE ORAL ONCE
Status: COMPLETED | OUTPATIENT
Start: 2025-03-21 | End: 2025-03-21

## 2025-03-21 RX ORDER — LIDOCAINE HYDROCHLORIDE 20 MG/ML
INJECTION, SOLUTION EPIDURAL; INFILTRATION; INTRACAUDAL; PERINEURAL
Status: DISCONTINUED | OUTPATIENT
Start: 2025-03-21 | End: 2025-03-21 | Stop reason: SDUPTHER

## 2025-03-21 RX ORDER — SODIUM CHLORIDE 0.9 % (FLUSH) 0.9 %
5-40 SYRINGE (ML) INJECTION PRN
Status: DISCONTINUED | OUTPATIENT
Start: 2025-03-21 | End: 2025-03-21 | Stop reason: HOSPADM

## 2025-03-21 RX ORDER — OXYCODONE HYDROCHLORIDE 5 MG/1
10 TABLET ORAL PRN
Status: COMPLETED | OUTPATIENT
Start: 2025-03-21 | End: 2025-03-21

## 2025-03-21 RX ORDER — ONDANSETRON 2 MG/ML
INJECTION INTRAMUSCULAR; INTRAVENOUS
Status: DISCONTINUED | OUTPATIENT
Start: 2025-03-21 | End: 2025-03-21 | Stop reason: SDUPTHER

## 2025-03-21 RX ORDER — SODIUM CHLORIDE, SODIUM LACTATE, POTASSIUM CHLORIDE, CALCIUM CHLORIDE 600; 310; 30; 20 MG/100ML; MG/100ML; MG/100ML; MG/100ML
INJECTION, SOLUTION INTRAVENOUS CONTINUOUS
Status: DISCONTINUED | OUTPATIENT
Start: 2025-03-21 | End: 2025-03-21 | Stop reason: HOSPADM

## 2025-03-21 RX ORDER — MIDAZOLAM HYDROCHLORIDE 1 MG/ML
INJECTION, SOLUTION INTRAMUSCULAR; INTRAVENOUS
Status: DISCONTINUED | OUTPATIENT
Start: 2025-03-21 | End: 2025-03-21 | Stop reason: SDUPTHER

## 2025-03-21 RX ORDER — FENTANYL CITRATE 50 UG/ML
25 INJECTION, SOLUTION INTRAMUSCULAR; INTRAVENOUS EVERY 5 MIN PRN
Status: COMPLETED | OUTPATIENT
Start: 2025-03-21 | End: 2025-03-21

## 2025-03-21 RX ORDER — NALOXONE HYDROCHLORIDE 0.4 MG/ML
INJECTION, SOLUTION INTRAMUSCULAR; INTRAVENOUS; SUBCUTANEOUS PRN
Status: DISCONTINUED | OUTPATIENT
Start: 2025-03-21 | End: 2025-03-21 | Stop reason: HOSPADM

## 2025-03-21 RX ORDER — ACETAMINOPHEN 500 MG
1000 TABLET ORAL EVERY 6 HOURS PRN
Qty: 120 TABLET | Refills: 0 | Status: SHIPPED | OUTPATIENT
Start: 2025-03-21

## 2025-03-21 RX ORDER — ROCURONIUM BROMIDE 10 MG/ML
INJECTION, SOLUTION INTRAVENOUS
Status: DISCONTINUED | OUTPATIENT
Start: 2025-03-21 | End: 2025-03-21 | Stop reason: SDUPTHER

## 2025-03-21 RX ORDER — DEXAMETHASONE SODIUM PHOSPHATE 4 MG/ML
INJECTION, SOLUTION INTRA-ARTICULAR; INTRALESIONAL; INTRAMUSCULAR; INTRAVENOUS; SOFT TISSUE
Status: DISCONTINUED | OUTPATIENT
Start: 2025-03-21 | End: 2025-03-21 | Stop reason: SDUPTHER

## 2025-03-21 RX ORDER — ACETAMINOPHEN 500 MG
1000 TABLET ORAL ONCE
Status: COMPLETED | OUTPATIENT
Start: 2025-03-21 | End: 2025-03-21

## 2025-03-21 RX ORDER — IBUPROFEN 800 MG/1
800 TABLET, FILM COATED ORAL EVERY 6 HOURS PRN
Qty: 30 TABLET | Refills: 1 | Status: SHIPPED | OUTPATIENT
Start: 2025-03-21

## 2025-03-21 RX ORDER — DOCUSATE SODIUM 100 MG/1
100 CAPSULE, LIQUID FILLED ORAL 2 TIMES DAILY PRN
Qty: 30 CAPSULE | Refills: 1 | Status: SHIPPED | OUTPATIENT
Start: 2025-03-21

## 2025-03-21 RX ORDER — OXYCODONE HYDROCHLORIDE 5 MG/1
5 TABLET ORAL EVERY 6 HOURS PRN
Qty: 15 TABLET | Refills: 0 | Status: SHIPPED | OUTPATIENT
Start: 2025-03-21 | End: 2025-03-24

## 2025-03-21 RX ORDER — BUPIVACAINE HYDROCHLORIDE AND EPINEPHRINE 5; 5 MG/ML; UG/ML
INJECTION, SOLUTION EPIDURAL; INTRACAUDAL; PERINEURAL
Status: DISCONTINUED | OUTPATIENT
Start: 2025-03-21 | End: 2025-03-21 | Stop reason: ALTCHOICE

## 2025-03-21 RX ORDER — SODIUM CHLORIDE, SODIUM LACTATE, POTASSIUM CHLORIDE, CALCIUM CHLORIDE 600; 310; 30; 20 MG/100ML; MG/100ML; MG/100ML; MG/100ML
INJECTION, SOLUTION INTRAVENOUS
Status: DISCONTINUED | OUTPATIENT
Start: 2025-03-21 | End: 2025-03-21 | Stop reason: SDUPTHER

## 2025-03-21 RX ORDER — METOCLOPRAMIDE HYDROCHLORIDE 5 MG/ML
10 INJECTION INTRAMUSCULAR; INTRAVENOUS
Status: DISCONTINUED | OUTPATIENT
Start: 2025-03-21 | End: 2025-03-21 | Stop reason: HOSPADM

## 2025-03-21 RX ADMIN — OXYCODONE HYDROCHLORIDE 5 MG: 5 TABLET ORAL at 15:58

## 2025-03-21 RX ADMIN — ROCURONIUM BROMIDE 20 MG: 10 INJECTION, SOLUTION INTRAVENOUS at 13:55

## 2025-03-21 RX ADMIN — SODIUM CHLORIDE, SODIUM LACTATE, POTASSIUM CHLORIDE, AND CALCIUM CHLORIDE: .6; .31; .03; .02 INJECTION, SOLUTION INTRAVENOUS at 10:32

## 2025-03-21 RX ADMIN — PROPOFOL 120 MG: 10 INJECTION, EMULSION INTRAVENOUS at 13:14

## 2025-03-21 RX ADMIN — HYDROMORPHONE HYDROCHLORIDE 0.5 MG: 1 INJECTION, SOLUTION INTRAMUSCULAR; INTRAVENOUS; SUBCUTANEOUS at 15:20

## 2025-03-21 RX ADMIN — MIDAZOLAM 2 MG: 1 INJECTION INTRAMUSCULAR; INTRAVENOUS at 13:06

## 2025-03-21 RX ADMIN — LIDOCAINE HYDROCHLORIDE 50 MG: 20 INJECTION, SOLUTION EPIDURAL; INFILTRATION; INTRACAUDAL; PERINEURAL at 13:14

## 2025-03-21 RX ADMIN — Medication 0.1 MG: at 13:34

## 2025-03-21 RX ADMIN — FENTANYL CITRATE 25 MCG: 50 INJECTION, SOLUTION INTRAMUSCULAR; INTRAVENOUS at 15:32

## 2025-03-21 RX ADMIN — SODIUM CHLORIDE, POTASSIUM CHLORIDE, SODIUM LACTATE AND CALCIUM CHLORIDE: 600; 310; 30; 20 INJECTION, SOLUTION INTRAVENOUS at 13:25

## 2025-03-21 RX ADMIN — ONDANSETRON 4 MG: 2 INJECTION INTRAMUSCULAR; INTRAVENOUS at 16:25

## 2025-03-21 RX ADMIN — ONDANSETRON 4 MG: 2 INJECTION INTRAMUSCULAR; INTRAVENOUS at 13:23

## 2025-03-21 RX ADMIN — DEXAMETHASONE SODIUM PHOSPHATE 8 MG: 4 INJECTION, SOLUTION INTRAMUSCULAR; INTRAVENOUS at 13:23

## 2025-03-21 RX ADMIN — PROPOFOL 50 MG: 10 INJECTION, EMULSION INTRAVENOUS at 13:44

## 2025-03-21 RX ADMIN — HYDROMORPHONE HYDROCHLORIDE 0.5 MG: 1 INJECTION, SOLUTION INTRAMUSCULAR; INTRAVENOUS; SUBCUTANEOUS at 14:05

## 2025-03-21 RX ADMIN — FENTANYL CITRATE 50 MCG: 50 INJECTION, SOLUTION INTRAMUSCULAR; INTRAVENOUS at 13:11

## 2025-03-21 RX ADMIN — CELECOXIB 100 MG: 100 CAPSULE ORAL at 10:43

## 2025-03-21 RX ADMIN — ACETAMINOPHEN 1000 MG: 500 TABLET ORAL at 10:43

## 2025-03-21 RX ADMIN — HYDROMORPHONE HYDROCHLORIDE 0.5 MG: 1 INJECTION, SOLUTION INTRAMUSCULAR; INTRAVENOUS; SUBCUTANEOUS at 15:14

## 2025-03-21 RX ADMIN — FENTANYL CITRATE 50 MCG: 50 INJECTION, SOLUTION INTRAMUSCULAR; INTRAVENOUS at 13:44

## 2025-03-21 RX ADMIN — FENTANYL CITRATE 25 MCG: 50 INJECTION, SOLUTION INTRAMUSCULAR; INTRAVENOUS at 15:27

## 2025-03-21 RX ADMIN — SUGAMMADEX 98 MG: 100 INJECTION, SOLUTION INTRAVENOUS at 14:37

## 2025-03-21 RX ADMIN — ROCURONIUM BROMIDE 30 MG: 10 INJECTION, SOLUTION INTRAVENOUS at 13:14

## 2025-03-21 ASSESSMENT — PAIN DESCRIPTION - DESCRIPTORS
DESCRIPTORS: ACHING
DESCRIPTORS: SORE
DESCRIPTORS: ACHING
DESCRIPTORS: SORE
DESCRIPTORS: TENDER
DESCRIPTORS: TENDER
DESCRIPTORS: ACHING
DESCRIPTORS: ACHING

## 2025-03-21 ASSESSMENT — PAIN DESCRIPTION - PAIN TYPE
TYPE: SURGICAL PAIN

## 2025-03-21 ASSESSMENT — PAIN SCALES - GENERAL
PAINLEVEL_OUTOF10: 6
PAINLEVEL_OUTOF10: 9
PAINLEVEL_OUTOF10: 10
PAINLEVEL_OUTOF10: 6
PAINLEVEL_OUTOF10: 6

## 2025-03-21 ASSESSMENT — PAIN DESCRIPTION - ORIENTATION
ORIENTATION: RIGHT;LEFT
ORIENTATION: MID

## 2025-03-21 ASSESSMENT — PAIN DESCRIPTION - LOCATION
LOCATION: ABDOMEN

## 2025-03-21 ASSESSMENT — PAIN - FUNCTIONAL ASSESSMENT
PAIN_FUNCTIONAL_ASSESSMENT: 0-10
PAIN_FUNCTIONAL_ASSESSMENT: PREVENTS OR INTERFERES SOME ACTIVE ACTIVITIES AND ADLS
PAIN_FUNCTIONAL_ASSESSMENT: PREVENTS OR INTERFERES SOME ACTIVE ACTIVITIES AND ADLS
PAIN_FUNCTIONAL_ASSESSMENT: 0-10
PAIN_FUNCTIONAL_ASSESSMENT: PREVENTS OR INTERFERES SOME ACTIVE ACTIVITIES AND ADLS
PAIN_FUNCTIONAL_ASSESSMENT: 0-10
PAIN_FUNCTIONAL_ASSESSMENT: ACTIVITIES ARE NOT PREVENTED

## 2025-03-21 ASSESSMENT — PAIN DESCRIPTION - ONSET
ONSET: ON-GOING
ONSET: AWAKENED FROM SLEEP
ONSET: ON-GOING
ONSET: AWAKENED FROM SLEEP
ONSET: ON-GOING

## 2025-03-21 ASSESSMENT — PAIN DESCRIPTION - FREQUENCY
FREQUENCY: CONTINUOUS

## 2025-03-21 NOTE — ANESTHESIA PROCEDURE NOTES
Airway  Date/Time: 3/21/2025 1:16 PM  Urgency: elective    Airway not difficult    General Information and Staff    Patient location during procedure: OR  Resident/CRNA: Rolando Bañuelos APRN - CRNA  Performed: resident/CRNA/CAA   Performed by: Rolando Bañuelos APRN - CRNA  Authorized by: Mita Livingston MD      Indications and Patient Condition  Indications for airway management: anesthesia and airway protection  Spontaneous Ventilation: absent  Sedation level: deep  Preoxygenated: yes  Patient position: sniffing  MILS not maintained throughout  Mask difficulty assessment: vent by bag mask + OA or adjuvant +/- NMBA    Final Airway Details  Final airway type: endotracheal airway      Successful airway: ETT  Cuffed: yes   Successful intubation technique: direct laryngoscopy  Facilitating devices/methods: intubating stylet  Endotracheal tube insertion site: oral  Blade: Renato  Blade size: #3  ETT size (mm): 7.0  Cormack-Lehane Classification: grade I - full view of glottis  Placement verified by: chest auscultation, bronchoscopy and capnometry   Measured from: lips  ETT to lips (cm): 21  Number of attempts at approach: 1  Ventilation between attempts: bag mask  Number of other approaches attempted: 0    no

## 2025-03-21 NOTE — OP NOTE
Department of Obstetrics and Gynecology  Operative Report  Name:  Melida Vazquez   CSN: [id number]   Attending Provider: Sultana Alcaraz DO  Location:  OR/NONE   : 2000   Age: 24 y.o.    SURGEON: Surgeon(s):  Sultana Alcaraz DO     ASSISTANT(S): Maryan Oropeza CFA  The use of a first assistant was necessary for the proper positioning, prepping, and draping of the patient, as well as the safe and expeditious execution of the case and closure of skin and subcutaneous tissues.    PRE-OP DIAGNOSIS: Pelvic pain in female [R10.2]  Chronic female pelvic pain [R10.2, G89.29]  Endometriosis determined by laparoscopy [N80.9]     POST-OP DIAGNOSIS: Same as above, stage I endometriosis    PROCEDURE: Procedure(s):  LAPAROSCOPY ROBOTIC WITH EXCISION OF ENDOMETRIOSIS     SPECIMENS: Peritoneal biopsy    ANESTHESIA: General     EBL: 5 cc    DEVICES IMPLANTED: Interceed    COMPLICATIONS: None    INDICATIONS FOR PROCEDURE: Melida Vazquez is a 24 y.o. female who presented with complaints of pelvic pain and endometriosis. Patient with chronic pelvic pain and had stage I endometriosis noted on laparoscopy 25 with superficial implant noted on left uterosacral ligament. Most recent US on 3/4/25 was normal. Has tried Depo, OCP(mood swings), Paragard IUD(pain), nexplanon(irregular and heavy bleeding). Had discussed multiple methods with patient including progestin only pill which had previously been sent to pharmacy, but she did not take this. Also discussed Liletta IUD as well as Orilissa which patient had previously declined. Patient had nexplanon removed 3/11 due to heavy and irregular bleeding. At that time she desired to try Orilissa as well as surgical excision of endometriosis. She presents today for surgical intervention.      FINDINGS: On bimanual exam, the uterus was noted to be anteverted and of normal size, shape, and symmetry without any adnexal masses palpated. On laparoscopic exam what was visualized of the

## 2025-03-21 NOTE — ANESTHESIA POSTPROCEDURE EVALUATION
Department of Anesthesiology  Postprocedure Note    Patient: Melida Vazquez  MRN: [id number]  YOB: 2000  Date of evaluation: 3/21/2025    Procedure Summary       Date: 03/21/25 Room / Location: [address]    Anesthesia Start: 1305 Anesthesia Stop: 1456    Procedure: LAPAROSCOPY ROBOTIC WITH EXCISION OF ENDOMETRIOSIS (Abdomen/Perineum) Diagnosis:       Pelvic pain in female      Chronic female pelvic pain      Endometriosis determined by laparoscopy      (Pelvic pain in female [R10.2])      (Chronic female pelvic pain [R10.2, G89.29])      (Endometriosis determined by laparoscopy [N80.9])    Surgeons: Sultana Alcaraz DO Responsible Provider: Mita Livingston MD    Anesthesia Type: general ASA Status: 2            Anesthesia Type: No value filed.    Ellis Phase I: Ellis Score: 10    Ellis Phase II:      Anesthesia Post Evaluation    Patient location during evaluation: PACU  Patient participation: complete - patient participated  Level of consciousness: awake and alert  Airway patency: patent  Nausea & Vomiting: no nausea and no vomiting  Cardiovascular status: hemodynamically stable and blood pressure returned to baseline  Respiratory status: spontaneous ventilation and room air  Hydration status: euvolemic  Pain management: adequate    No notable events documented.

## 2025-03-21 NOTE — DISCHARGE INSTRUCTIONS
[facility]  [phone]    Do not drive, work around machines or use equipment.  Do not drink any alcoholic beverages.  Do not smoke while alone.  Avoid making important decisions.  Plan to spend a quiet, relaxed evening @ home.  Resume normal activities as you begin to feel better.  Eat lightly for your first meal, then gradually increase your diet to what is normal for you.  In case of nausea, avoid food and drink only clear liquids.  Resume food as nausea ceases.  Notify your surgeon if you experience fever, chills, large amount of bleeding, difficulty breathing, persistent nausea and vomiting or any other disturbing problem.  Call for a follow-up appointment with your surgeon.    [address] [place]  [address]   Phone [phone]   Fax [phone]     Hysterectomy, , and Exploratory Laparotomy    POST-OPERATIVE INSTRUCTIONS        DANGER SIGNS: Call OB/GYN if they occur:     A fever of more than 100.4   Report excessive bleeding (saturating a pad every ½ to 1 hour).   Severe pain, unrelieved by normal medication.   Shortness of breath, difficulty breathing or chest pain.   Painful or burning urination.   Severe back pain.   Painful swelling or redness in legs.   Drainage or pus from the wound.   Foul smelling vaginal discharge.     Eating and drinking:     You may continue your regular diet, however, we recommend that you:    Eat multiple small meals.   Eat foods high in protein such as meat, fish, eggs, and dairy products.   Avoid hot, spicy, and fatty foods.   Eat fruits with vitamin C (i.e. citrus fruits), vegetables, and high fiber foods.   Drink at least 3 quarts of liquid a day (try to drink more earlier in the day).     Constipation:     Narcotic pain medications such as Percocet, Vicodin, and codeine can cause constipation.   Try the following to avoid constipation:   Eat high fiber foods such as prunes or adda fiber supplement such as Metamucil

## 2025-03-21 NOTE — PROGRESS NOTES
23292 patient to room from pacu a/o vss assessment wnl,states pain is at a 6,will call family to the room,call light in reach,beverage of choice given  1554 family to go get scripts  1559 patient medicated per orders  1600 family to room  1625 vss assessment wnl,c/o nausea,small emesis noted,prn zofran given  1633 discharge instructions reviewed with family voiced understanding  1653 vss patient up to the side of the bed to get dressed,iv removed  1657 family to go get the car  1701 patient to car per staff

## 2025-03-21 NOTE — PROGRESS NOTES
1455: pt arrived to PACU. Monitors applied and alarms on. Report from Andrew MORGAN and Rolando TREVINO. X 4 incision sites present. No drainage from sites. Pt resting on arrival. Respirations are even and unlabored. No concerns noted at this time.   1514: 0.5 mg Dilaudid given  1520: 0.5 mg Dilaudid given  1527: 25 mcg Fentanyl given   1532: 25 mcg Fentanyl given   1534: pt states she wants her bf and is ready to go.

## 2025-03-21 NOTE — PROGRESS NOTES
Outpatient Pharmacy Progress Note for Meds-to-Beds    Total number of Prescriptions Filled: 4    Additional Documentation:  Patient's family member picked-up the medication(s) in the OP Pharmacy      Thank you for letting us serve your patients.  [address] [address]    Phone: [phone]    Fax: [phone]

## 2025-03-24 LAB — SURGICAL PATHOLOGY REPORT: NORMAL

## 2025-03-25 ENCOUNTER — TELEPHONE (OUTPATIENT)
Dept: OBGYN | Age: 25
End: 2025-03-25

## 2025-03-25 DIAGNOSIS — R11.2 NAUSEA AND VOMITING, UNSPECIFIED VOMITING TYPE: Primary | ICD-10-CM

## 2025-03-25 RX ORDER — ONDANSETRON 4 MG/1
4 TABLET, FILM COATED ORAL 3 TIMES DAILY PRN
Qty: 15 TABLET | Refills: 1 | Status: SHIPPED | OUTPATIENT
Start: 2025-03-25

## 2025-03-25 NOTE — TELEPHONE ENCOUNTER
Pt had Laparoscopy w/ excision of endometriosis on 3/21/25. She states she is unable to keep foods down. Pt requesting medication for nausea. Please advise.

## 2025-03-25 NOTE — TELEPHONE ENCOUNTER
Also.  When is she able to return to work? Surgery was 3/21/25, and she is under the assumption she is off until after her 4/8/25 post op appt.

## 2025-04-08 ENCOUNTER — OFFICE VISIT (OUTPATIENT)
Dept: OBGYN | Age: 25
End: 2025-04-08

## 2025-04-08 VITALS
DIASTOLIC BLOOD PRESSURE: 82 MMHG | SYSTOLIC BLOOD PRESSURE: 120 MMHG | BODY MASS INDEX: 19.12 KG/M2 | HEIGHT: 64 IN | HEART RATE: 94 BPM | WEIGHT: 112 LBS

## 2025-04-08 DIAGNOSIS — N80.9 ENDOMETRIOSIS DETERMINED BY LAPAROSCOPY: ICD-10-CM

## 2025-04-08 DIAGNOSIS — R10.2 CHRONIC FEMALE PELVIC PAIN: ICD-10-CM

## 2025-04-08 DIAGNOSIS — N92.1 MENORRHAGIA WITH IRREGULAR CYCLE: ICD-10-CM

## 2025-04-08 DIAGNOSIS — N94.6 DYSMENORRHEA: ICD-10-CM

## 2025-04-08 DIAGNOSIS — Z09 POSTOPERATIVE EXAMINATION: Primary | ICD-10-CM

## 2025-04-08 DIAGNOSIS — G89.29 CHRONIC FEMALE PELVIC PAIN: ICD-10-CM

## 2025-04-08 PROCEDURE — 99024 POSTOP FOLLOW-UP VISIT: CPT | Performed by: STUDENT IN AN ORGANIZED HEALTH CARE EDUCATION/TRAINING PROGRAM

## 2025-04-08 NOTE — PROGRESS NOTES
sweats  Respiratory: negative for cough, dyspnea on exertion, hemoptysis and wheezing  Cardiovascular: negative for chest pain and chest pressure/discomfort  Gastrointestinal: negative for change in bowel habits. Denies nausea, no vomiting  Genitourinary:negative for dysuria, hematuria and nocturia  Breast: negative for breast lump, breast tenderness, nipple discharge  Musculoskeletal: negative  Neurological: negative for headaches, seizures and weakness  Behavioral/Psych: negative     Objective:    Vitals:  /82 (BP Site: Right Upper Arm, Patient Position: Sitting, BP Cuff Size: Medium Adult)   Pulse 94   Ht 1.626 m (5' 4\")   Wt 50.8 kg (112 lb)   LMP 03/20/2025 (Approximate)   BMI 19.22 kg/m²      General: alert, appears stated age and cooperative  Abdomen: soft, non-tender, incisions healing well, no erythema or drainage, no significant tenderness      Assessment and Plan:     Diagnosis Orders   1. Postoperative examination        2. Endometriosis determined by laparoscopy        3. Chronic female pelvic pain        4. Menorrhagia with irregular cycle        5. Dysmenorrhea          S/p robotic laparoscopy with excision of endometriosis on 3/21  Doing well post operatively.  Continue Orilissa at this time.  Continue any current medications.  Wound care discussed.  Activity restrictions: No heavy lifting greater than 20 lbs for 3-4 weeks postop.  Operative findings reviewed again. Pathology report discussed. Endometriosis   Follow up: 3 month follow up to see how she is doing on Orilissa       Electronically signed by: Sultana Alcaraz DO 4/8/2025  4:18 PM

## 2025-06-17 ENCOUNTER — OFFICE VISIT (OUTPATIENT)
Dept: OBGYN | Age: 25
End: 2025-06-17
Payer: COMMERCIAL

## 2025-06-17 VITALS
BODY MASS INDEX: 20.14 KG/M2 | SYSTOLIC BLOOD PRESSURE: 120 MMHG | WEIGHT: 118 LBS | HEIGHT: 64 IN | DIASTOLIC BLOOD PRESSURE: 70 MMHG

## 2025-06-17 DIAGNOSIS — G89.29 CHRONIC FEMALE PELVIC PAIN: Primary | ICD-10-CM

## 2025-06-17 DIAGNOSIS — Z30.09 FAMILY PLANNING ADVICE: ICD-10-CM

## 2025-06-17 DIAGNOSIS — N80.9 ENDOMETRIOSIS DETERMINED BY LAPAROSCOPY: Primary | ICD-10-CM

## 2025-06-17 DIAGNOSIS — N80.9 ENDOMETRIOSIS DETERMINED BY LAPAROSCOPY: ICD-10-CM

## 2025-06-17 DIAGNOSIS — G89.29 CHRONIC FEMALE PELVIC PAIN: ICD-10-CM

## 2025-06-17 DIAGNOSIS — N92.1 MENORRHAGIA WITH IRREGULAR CYCLE: ICD-10-CM

## 2025-06-17 DIAGNOSIS — N92.6 IRREGULAR MENSES: ICD-10-CM

## 2025-06-17 DIAGNOSIS — R10.2 CHRONIC FEMALE PELVIC PAIN: ICD-10-CM

## 2025-06-17 DIAGNOSIS — R10.2 CHRONIC FEMALE PELVIC PAIN: Primary | ICD-10-CM

## 2025-06-17 LAB
CONTROL: NORMAL
PREGNANCY TEST URINE, POC: NEGATIVE

## 2025-06-17 PROCEDURE — 81025 URINE PREGNANCY TEST: CPT | Performed by: STUDENT IN AN ORGANIZED HEALTH CARE EDUCATION/TRAINING PROGRAM

## 2025-06-17 PROCEDURE — 99213 OFFICE O/P EST LOW 20 MIN: CPT | Performed by: STUDENT IN AN ORGANIZED HEALTH CARE EDUCATION/TRAINING PROGRAM

## 2025-06-17 RX ORDER — IBUPROFEN 800 MG/1
800 TABLET, FILM COATED ORAL EVERY 6 HOURS PRN
Qty: 30 TABLET | Refills: 2 | Status: SHIPPED | OUTPATIENT
Start: 2025-06-17

## 2025-06-17 NOTE — PROGRESS NOTES
Department of Obstetrics and Gynecology  Office Visit  Name:  Melida Vazquez   CSN: [id number]    : 2000   Age: 24 y.o.       Chief Complaint   Patient presents with    Other     Patient presents today for irregular menses and abdominal cramping. LMP: unknown. Pt states cramping started 2 months ago and cramping presents every day. Pt reports mild cramping that worsens. Pt requesting pregnancy test- nehative in office today       SUBJECTIVE:  Melida Vazquez is a 24 y.o.  who presents for irregular cycles and abdominal cramping. Reports this started two months ago with cramping every day. Requesting pregnancy test. Reports she did not get relief from surgery. Stopped taking Orilissa due to suicidal thoughts while on medication.      GYN HX:    Menses:   No LMP recorded (lmp unknown).    Patient's medications, allergies, past medical, surgical, social and family histories were reviewed and updated as appropriate.    Past Medical History:   Diagnosis Date    Anxiety     Asthma     Depression     Dizziness     Dysmenorrhea     Dyspareunia in female     Endometriosis     Herpes simplex virus (HSV) infection     Irregular menses     Menorrhagia     PONV (postoperative nausea and vomiting)     PTSD (post-traumatic stress disorder)     DX AT 13 YEARS NOT TAKING MEDICATIONS    STD (sexually transmitted disease)     Suicidal behavior with attempted self-injury (HCC)     LAST TIME WAS 4 YEARS AGO updated 25    Trauma        Past Surgical History:   Procedure Laterality Date    LAPAROSCOPY N/A 2024    LAPAROSCOPY EXPLORATORY; ASPIRATION OF PELVIC FLUID performed by Sultana Alcaraz DO at [facility] OR    LAPAROSCOPY N/A 3/21/2025    LAPAROSCOPY ROBOTIC WITH EXCISION OF ENDOMETRIOSIS performed by Sultana Alcaraz DO at [facility] OR       Social History     Socioeconomic History    Marital status: Single     Spouse name: Not on file    Number of children: Not on file    Years of education: Not on file    Highest

## 2025-07-28 ENCOUNTER — OFFICE VISIT (OUTPATIENT)
Dept: OBGYN | Age: 25
End: 2025-07-28
Payer: COMMERCIAL

## 2025-07-28 VITALS
BODY MASS INDEX: 20.25 KG/M2 | WEIGHT: 118 LBS | HEART RATE: 75 BPM | DIASTOLIC BLOOD PRESSURE: 79 MMHG | SYSTOLIC BLOOD PRESSURE: 107 MMHG

## 2025-07-28 DIAGNOSIS — R10.2 CHRONIC FEMALE PELVIC PAIN: Primary | ICD-10-CM

## 2025-07-28 DIAGNOSIS — G89.29 CHRONIC FEMALE PELVIC PAIN: Primary | ICD-10-CM

## 2025-07-28 PROCEDURE — 99214 OFFICE O/P EST MOD 30 MIN: CPT | Performed by: OBSTETRICS & GYNECOLOGY

## 2025-07-28 RX ORDER — IBUPROFEN 800 MG/1
800 TABLET, FILM COATED ORAL EVERY 8 HOURS PRN
Qty: 30 TABLET | Refills: 1 | Status: SHIPPED | OUTPATIENT
Start: 2025-07-28

## 2025-07-28 NOTE — PROGRESS NOTES
7/28/25    Melida Vazquez  2000    Chief Complaint   Patient presents with    Pelvic Pain     Reports pelvic pain. Pain started 1month and has worsened. Pain is located: pelvic diffusely  and radiates into lower back. Reports the pain as severe and throbbing, shooting, stabbing, and nagging quality.  No aggravating factors.  No alleviating factors.. Evaluated in the past and dx with endometritis and was treated with NSAIDS, laparoscopy.   Pain intensifies on left pelvic area with intercourse        Melida Vazquez is a 25 y.o. female who presents today for evaluation of see above.    Past Medical History:   Diagnosis Date    Anxiety     Asthma     Depression     Dizziness     Dysmenorrhea     Dyspareunia in female     Endometriosis     Herpes simplex virus (HSV) infection     Irregular menses     Menorrhagia     PONV (postoperative nausea and vomiting)     PTSD (post-traumatic stress disorder)     DX AT 13 YEARS NOT TAKING MEDICATIONS    STD (sexually transmitted disease)     Suicidal behavior with attempted self-injury (HCC)     LAST TIME WAS 4 YEARS AGO updated 11/13/24    Trauma        Past Surgical History:   Procedure Laterality Date    LAPAROSCOPY N/A 11/18/2024    LAPAROSCOPY EXPLORATORY; ASPIRATION OF PELVIC FLUID performed by Sultana Alcaraz DO at [facility] OR    LAPAROSCOPY N/A 3/21/2025    LAPAROSCOPY ROBOTIC WITH EXCISION OF ENDOMETRIOSIS performed by Sultana Alcaraz DO at [facility] OR       Social History     Tobacco Use    Smoking status: Some Days     Types: Cigarettes    Smokeless tobacco: Never   Vaping Use    Vaping status: Never Used   Substance Use Topics    Alcohol use: Yes     Alcohol/week: 8.0 standard drinks of alcohol     Types: 8 Cans of beer per week     Comment: 3-4 times a week    Drug use: Yes     Frequency: 5.0 times per week     Types: Marijuana (Weed)     Comment: smokes       Family History   Problem Relation Age of Onset    Hypertension Maternal Grandmother     Hypertension Mother

## 2025-07-29 ENCOUNTER — TELEPHONE (OUTPATIENT)
Dept: OBGYN | Age: 25
End: 2025-07-29

## 2025-07-29 LAB
C TRACH DNA SPEC QL NAA+PROBE: NOT DETECTED
CANDIDA RRNA VAG QL PROBE: NOT DETECTED
CANDIDA RRNA VAG QL PROBE: NOT DETECTED
CARBAPENEM RESISTANCE OXA-48 GENE BY PCR: NOT DETECTED
CEPHALOSPORIN RESISTANCE AMPC GENE: NOT DETECTED
ESBL RESISTANCE: NOT DETECTED
G VAGINALIS RRNA GENITAL QL PROBE: DETECTED
M HOMINIS DNA BLD QL NAA+PROBE: NOT DETECTED
MACROLIDE RESISTANCE: NOT DETECTED
METHICILLIN RESISTANCE: NOT DETECTED
MYCOPLASMA DNA SPEC QL NAA+PROBE: NOT DETECTED
N GONORRHOEA DNA SPEC QL NAA+PROBE: NOT DETECTED
OTHER MICROORG DNA SPEC QL NAA+PROBE: DETECTED
OTHER MICROORG DNA SPEC QL NAA+PROBE: NOT DETECTED
QUINOLONE AND FLUOROQUINOLONE RESISTANCE: NOT DETECTED
T VAGINALIS RRNA SPEC QL NAA+PROBE: NOT DETECTED
TETRACYCLINE RESISTANCE: NOT DETECTED
TRIMETHOPRIM/SULFONAMIDE RESISTANCE: NOT DETECTED

## 2025-07-29 NOTE — TELEPHONE ENCOUNTER
Pt had ov yesterday and discussed being prescribed Toradol for pain. Pt was sent in Ibuprofen 800. Please advise.

## 2025-07-30 RX ORDER — KETOROLAC TROMETHAMINE 10 MG/1
10 TABLET, FILM COATED ORAL EVERY 6 HOURS PRN
Qty: 20 TABLET | Refills: 0 | Status: SHIPPED | OUTPATIENT
Start: 2025-07-30 | End: 2026-07-30

## 2025-08-06 ENCOUNTER — OFFICE VISIT (OUTPATIENT)
Dept: OBGYN | Age: 25
End: 2025-08-06
Payer: COMMERCIAL

## 2025-08-06 VITALS
HEIGHT: 64 IN | HEART RATE: 76 BPM | BODY MASS INDEX: 20.04 KG/M2 | DIASTOLIC BLOOD PRESSURE: 77 MMHG | SYSTOLIC BLOOD PRESSURE: 117 MMHG | WEIGHT: 117.4 LBS

## 2025-08-06 DIAGNOSIS — G89.29 CHRONIC FEMALE PELVIC PAIN: Primary | ICD-10-CM

## 2025-08-06 DIAGNOSIS — R10.2 CHRONIC FEMALE PELVIC PAIN: Primary | ICD-10-CM

## 2025-08-06 PROCEDURE — 99213 OFFICE O/P EST LOW 20 MIN: CPT | Performed by: OBSTETRICS & GYNECOLOGY

## 2025-08-07 ENCOUNTER — TELEPHONE (OUTPATIENT)
Age: 25
End: 2025-08-07

## 2025-08-13 ENCOUNTER — OFFICE VISIT (OUTPATIENT)
Dept: OBGYN | Age: 25
End: 2025-08-13
Payer: COMMERCIAL

## 2025-08-13 VITALS
HEIGHT: 64 IN | WEIGHT: 118 LBS | BODY MASS INDEX: 20.14 KG/M2 | HEART RATE: 141 BPM | DIASTOLIC BLOOD PRESSURE: 92 MMHG | SYSTOLIC BLOOD PRESSURE: 111 MMHG

## 2025-08-13 DIAGNOSIS — R10.2 PELVIC PAIN: ICD-10-CM

## 2025-08-13 DIAGNOSIS — N83.209 CYST OF OVARY, UNSPECIFIED LATERALITY: Primary | ICD-10-CM

## 2025-08-13 PROCEDURE — 99214 OFFICE O/P EST MOD 30 MIN: CPT | Performed by: OBSTETRICS & GYNECOLOGY

## 2025-08-16 ENCOUNTER — ANESTHESIA EVENT (OUTPATIENT)
Dept: OPERATING ROOM | Age: 25
End: 2025-08-16
Payer: COMMERCIAL

## 2025-08-19 ENCOUNTER — HOSPITAL ENCOUNTER (OUTPATIENT)
Age: 25
Setting detail: OUTPATIENT SURGERY
Discharge: HOME | End: 2025-08-19
Attending: OBSTETRICS & GYNECOLOGY | Admitting: OBSTETRICS & GYNECOLOGY
Payer: COMMERCIAL

## 2025-08-19 ENCOUNTER — ANESTHESIA (OUTPATIENT)
Dept: OPERATING ROOM | Age: 25
End: 2025-08-19
Payer: COMMERCIAL

## 2025-08-19 VITALS
BODY MASS INDEX: 19.97 KG/M2 | RESPIRATION RATE: 16 BRPM | OXYGEN SATURATION: 99 % | HEIGHT: 64 IN | TEMPERATURE: 97.2 F | HEART RATE: 103 BPM | SYSTOLIC BLOOD PRESSURE: 98 MMHG | WEIGHT: 117 LBS | DIASTOLIC BLOOD PRESSURE: 57 MMHG

## 2025-08-19 DIAGNOSIS — N83.209 CYST OF OVARY, UNSPECIFIED LATERALITY: ICD-10-CM

## 2025-08-19 DIAGNOSIS — R10.2 CHRONIC FEMALE PELVIC PAIN: ICD-10-CM

## 2025-08-19 DIAGNOSIS — G89.29 CHRONIC FEMALE PELVIC PAIN: ICD-10-CM

## 2025-08-19 DIAGNOSIS — R10.2 PELVIC PAIN IN FEMALE: ICD-10-CM

## 2025-08-19 DIAGNOSIS — R10.2 PELVIC PAIN: ICD-10-CM

## 2025-08-19 LAB — HCG, PREGNANCY URINE (POC): NEGATIVE

## 2025-08-19 PROCEDURE — 3700000001 HC ADD 15 MINUTES (ANESTHESIA): Performed by: OBSTETRICS & GYNECOLOGY

## 2025-08-19 PROCEDURE — 7100000000 HC PACU RECOVERY - FIRST 15 MIN: Performed by: OBSTETRICS & GYNECOLOGY

## 2025-08-19 PROCEDURE — 3600000019 HC SURGERY ROBOT ADDTL 15MIN: Performed by: OBSTETRICS & GYNECOLOGY

## 2025-08-19 PROCEDURE — 2709999900 HC NON-CHARGEABLE SUPPLY: Performed by: OBSTETRICS & GYNECOLOGY

## 2025-08-19 PROCEDURE — 6360000002 HC RX W HCPCS: Performed by: NURSE ANESTHETIST, CERTIFIED REGISTERED

## 2025-08-19 PROCEDURE — 81025 URINE PREGNANCY TEST: CPT

## 2025-08-19 PROCEDURE — 3600000009 HC SURGERY ROBOT BASE: Performed by: OBSTETRICS & GYNECOLOGY

## 2025-08-19 PROCEDURE — 7100000011 HC PHASE II RECOVERY - ADDTL 15 MIN: Performed by: OBSTETRICS & GYNECOLOGY

## 2025-08-19 PROCEDURE — 6360000002 HC RX W HCPCS

## 2025-08-19 PROCEDURE — 2500000003 HC RX 250 WO HCPCS

## 2025-08-19 PROCEDURE — 6370000000 HC RX 637 (ALT 250 FOR IP): Performed by: ANESTHESIOLOGY

## 2025-08-19 PROCEDURE — 2580000003 HC RX 258: Performed by: ANESTHESIOLOGY

## 2025-08-19 PROCEDURE — 88305 TISSUE EXAM BY PATHOLOGIST: CPT | Performed by: PATHOLOGY

## 2025-08-19 PROCEDURE — S2900 ROBOTIC SURGICAL SYSTEM: HCPCS | Performed by: OBSTETRICS & GYNECOLOGY

## 2025-08-19 PROCEDURE — 2720000010 HC SURG SUPPLY STERILE: Performed by: OBSTETRICS & GYNECOLOGY

## 2025-08-19 PROCEDURE — 7100000010 HC PHASE II RECOVERY - FIRST 15 MIN: Performed by: OBSTETRICS & GYNECOLOGY

## 2025-08-19 PROCEDURE — 6360000002 HC RX W HCPCS: Performed by: OBSTETRICS & GYNECOLOGY

## 2025-08-19 PROCEDURE — 7100000001 HC PACU RECOVERY - ADDTL 15 MIN: Performed by: OBSTETRICS & GYNECOLOGY

## 2025-08-19 PROCEDURE — 6360000002 HC RX W HCPCS: Performed by: ANESTHESIOLOGY

## 2025-08-19 PROCEDURE — 3700000000 HC ANESTHESIA ATTENDED CARE: Performed by: OBSTETRICS & GYNECOLOGY

## 2025-08-19 PROCEDURE — 2500000003 HC RX 250 WO HCPCS: Performed by: OBSTETRICS & GYNECOLOGY

## 2025-08-19 RX ORDER — MIDAZOLAM HYDROCHLORIDE 1 MG/ML
INJECTION, SOLUTION INTRAMUSCULAR; INTRAVENOUS
Status: DISCONTINUED | OUTPATIENT
Start: 2025-08-19 | End: 2025-08-19 | Stop reason: SDUPTHER

## 2025-08-19 RX ORDER — SODIUM CHLORIDE, SODIUM LACTATE, POTASSIUM CHLORIDE, CALCIUM CHLORIDE 600; 310; 30; 20 MG/100ML; MG/100ML; MG/100ML; MG/100ML
INJECTION, SOLUTION INTRAVENOUS CONTINUOUS
Status: DISCONTINUED | OUTPATIENT
Start: 2025-08-19 | End: 2025-08-19 | Stop reason: HOSPADM

## 2025-08-19 RX ORDER — HYDRALAZINE HYDROCHLORIDE 20 MG/ML
10 INJECTION INTRAMUSCULAR; INTRAVENOUS
Status: DISCONTINUED | OUTPATIENT
Start: 2025-08-19 | End: 2025-08-19 | Stop reason: HOSPADM

## 2025-08-19 RX ORDER — BUPIVACAINE HYDROCHLORIDE 5 MG/ML
INJECTION, SOLUTION PERINEURAL
Status: DISCONTINUED | OUTPATIENT
Start: 2025-08-19 | End: 2025-08-19 | Stop reason: HOSPADM

## 2025-08-19 RX ORDER — DEXAMETHASONE SODIUM PHOSPHATE 4 MG/ML
INJECTION, SOLUTION INTRA-ARTICULAR; INTRALESIONAL; INTRAMUSCULAR; INTRAVENOUS; SOFT TISSUE
Status: DISCONTINUED | OUTPATIENT
Start: 2025-08-19 | End: 2025-08-19 | Stop reason: SDUPTHER

## 2025-08-19 RX ORDER — SODIUM CHLORIDE, SODIUM LACTATE, POTASSIUM CHLORIDE, AND CALCIUM CHLORIDE .6; .31; .03; .02 G/100ML; G/100ML; G/100ML; G/100ML
500 INJECTION, SOLUTION INTRAVENOUS ONCE
Status: COMPLETED | OUTPATIENT
Start: 2025-08-19 | End: 2025-08-19

## 2025-08-19 RX ORDER — FENTANYL CITRATE 50 UG/ML
INJECTION, SOLUTION INTRAMUSCULAR; INTRAVENOUS
Status: DISCONTINUED | OUTPATIENT
Start: 2025-08-19 | End: 2025-08-19 | Stop reason: SDUPTHER

## 2025-08-19 RX ORDER — HYDROCODONE BITARTRATE AND ACETAMINOPHEN 5; 325 MG/1; MG/1
1 TABLET ORAL EVERY 6 HOURS PRN
Qty: 20 TABLET | Refills: 0 | Status: SHIPPED | OUTPATIENT
Start: 2025-08-19 | End: 2025-08-24

## 2025-08-19 RX ORDER — LORAZEPAM 0.5 MG/1
1 TABLET ORAL ONCE
Status: COMPLETED | OUTPATIENT
Start: 2025-08-19 | End: 2025-08-19

## 2025-08-19 RX ORDER — OXYCODONE HYDROCHLORIDE 5 MG/1
5 TABLET ORAL
Status: DISCONTINUED | OUTPATIENT
Start: 2025-08-19 | End: 2025-08-19 | Stop reason: HOSPADM

## 2025-08-19 RX ORDER — ROCURONIUM BROMIDE 10 MG/ML
INJECTION, SOLUTION INTRAVENOUS
Status: DISCONTINUED | OUTPATIENT
Start: 2025-08-19 | End: 2025-08-19 | Stop reason: SDUPTHER

## 2025-08-19 RX ORDER — ONDANSETRON 2 MG/ML
4 INJECTION INTRAMUSCULAR; INTRAVENOUS
Status: COMPLETED | OUTPATIENT
Start: 2025-08-19 | End: 2025-08-19

## 2025-08-19 RX ORDER — LABETALOL HYDROCHLORIDE 5 MG/ML
10 INJECTION, SOLUTION INTRAVENOUS
Status: DISCONTINUED | OUTPATIENT
Start: 2025-08-19 | End: 2025-08-19 | Stop reason: HOSPADM

## 2025-08-19 RX ORDER — LIDOCAINE HYDROCHLORIDE 20 MG/ML
INJECTION, SOLUTION INTRAVENOUS
Status: DISCONTINUED | OUTPATIENT
Start: 2025-08-19 | End: 2025-08-19 | Stop reason: SDUPTHER

## 2025-08-19 RX ORDER — PROMETHAZINE HYDROCHLORIDE 25 MG/1
25 TABLET ORAL EVERY 6 HOURS PRN
Qty: 30 TABLET | Refills: 1 | Status: SHIPPED | OUTPATIENT
Start: 2025-08-19 | End: 2025-08-26

## 2025-08-19 RX ORDER — SODIUM CHLORIDE 0.9 % (FLUSH) 0.9 %
5-40 SYRINGE (ML) INJECTION EVERY 12 HOURS SCHEDULED
Status: DISCONTINUED | OUTPATIENT
Start: 2025-08-19 | End: 2025-08-19 | Stop reason: HOSPADM

## 2025-08-19 RX ORDER — MEPERIDINE HYDROCHLORIDE 25 MG/ML
12.5 INJECTION INTRAMUSCULAR; INTRAVENOUS; SUBCUTANEOUS EVERY 5 MIN PRN
Status: DISCONTINUED | OUTPATIENT
Start: 2025-08-19 | End: 2025-08-19 | Stop reason: HOSPADM

## 2025-08-19 RX ORDER — DROPERIDOL 2.5 MG/ML
0.62 INJECTION, SOLUTION INTRAMUSCULAR; INTRAVENOUS
Status: COMPLETED | OUTPATIENT
Start: 2025-08-19 | End: 2025-08-19

## 2025-08-19 RX ORDER — SODIUM CHLORIDE 9 MG/ML
INJECTION, SOLUTION INTRAVENOUS PRN
Status: DISCONTINUED | OUTPATIENT
Start: 2025-08-19 | End: 2025-08-19 | Stop reason: HOSPADM

## 2025-08-19 RX ORDER — KETOROLAC TROMETHAMINE 30 MG/ML
30 INJECTION, SOLUTION INTRAMUSCULAR; INTRAVENOUS ONCE
Status: COMPLETED | OUTPATIENT
Start: 2025-08-19 | End: 2025-08-19

## 2025-08-19 RX ORDER — ESMOLOL HYDROCHLORIDE 10 MG/ML
INJECTION INTRAVENOUS
Status: DISCONTINUED | OUTPATIENT
Start: 2025-08-19 | End: 2025-08-19 | Stop reason: SDUPTHER

## 2025-08-19 RX ORDER — SODIUM CHLORIDE 0.9 % (FLUSH) 0.9 %
5-40 SYRINGE (ML) INJECTION PRN
Status: DISCONTINUED | OUTPATIENT
Start: 2025-08-19 | End: 2025-08-19 | Stop reason: HOSPADM

## 2025-08-19 RX ORDER — PROPOFOL 10 MG/ML
INJECTION, EMULSION INTRAVENOUS
Status: DISCONTINUED | OUTPATIENT
Start: 2025-08-19 | End: 2025-08-19 | Stop reason: SDUPTHER

## 2025-08-19 RX ORDER — FENTANYL CITRATE 50 UG/ML
50 INJECTION, SOLUTION INTRAMUSCULAR; INTRAVENOUS EVERY 5 MIN PRN
Status: DISCONTINUED | OUTPATIENT
Start: 2025-08-19 | End: 2025-08-19 | Stop reason: HOSPADM

## 2025-08-19 RX ORDER — BUPIVACAINE HYDROCHLORIDE AND EPINEPHRINE 5; 5 MG/ML; UG/ML
INJECTION, SOLUTION EPIDURAL; INTRACAUDAL; PERINEURAL
Status: DISCONTINUED | OUTPATIENT
Start: 2025-08-19 | End: 2025-08-19 | Stop reason: HOSPADM

## 2025-08-19 RX ORDER — HYDROCODONE BITARTRATE AND ACETAMINOPHEN 5; 325 MG/1; MG/1
1 TABLET ORAL EVERY 6 HOURS PRN
Qty: 20 TABLET | Refills: 0 | Status: ON HOLD | OUTPATIENT
Start: 2025-08-19 | End: 2025-08-19

## 2025-08-19 RX ORDER — ONDANSETRON 2 MG/ML
INJECTION INTRAMUSCULAR; INTRAVENOUS
Status: DISCONTINUED | OUTPATIENT
Start: 2025-08-19 | End: 2025-08-19 | Stop reason: SDUPTHER

## 2025-08-19 RX ORDER — DIAZEPAM 10 MG/2ML
2.5 INJECTION, SOLUTION INTRAMUSCULAR; INTRAVENOUS ONCE
Status: COMPLETED | OUTPATIENT
Start: 2025-08-19 | End: 2025-08-19

## 2025-08-19 RX ADMIN — ONDANSETRON 4 MG: 2 INJECTION INTRAMUSCULAR; INTRAVENOUS at 13:35

## 2025-08-19 RX ADMIN — ESMOLOL HYDROCHLORIDE 20 MG: 100 INJECTION, SOLUTION INTRAVENOUS at 12:30

## 2025-08-19 RX ADMIN — LIDOCAINE HYDROCHLORIDE 80 MG: 20 INJECTION, SOLUTION INTRAVENOUS at 11:53

## 2025-08-19 RX ADMIN — DROPERIDOL 0.62 MG: 2.5 INJECTION, SOLUTION INTRAMUSCULAR; INTRAVENOUS at 14:39

## 2025-08-19 RX ADMIN — LORAZEPAM 1 MG: 0.5 TABLET ORAL at 10:45

## 2025-08-19 RX ADMIN — SODIUM CHLORIDE, SODIUM LACTATE, POTASSIUM CHLORIDE, AND CALCIUM CHLORIDE 500 ML: .6; .31; .03; .02 INJECTION, SOLUTION INTRAVENOUS at 14:10

## 2025-08-19 RX ADMIN — HYDROMORPHONE HYDROCHLORIDE 0.5 MG: 1 INJECTION, SOLUTION INTRAMUSCULAR; INTRAVENOUS; SUBCUTANEOUS at 14:09

## 2025-08-19 RX ADMIN — FENTANYL CITRATE 100 MCG: 50 INJECTION, SOLUTION INTRAMUSCULAR; INTRAVENOUS at 11:53

## 2025-08-19 RX ADMIN — FENTANYL CITRATE 50 MCG: 50 INJECTION INTRAMUSCULAR; INTRAVENOUS at 13:35

## 2025-08-19 RX ADMIN — FENTANYL CITRATE 50 MCG: 50 INJECTION INTRAMUSCULAR; INTRAVENOUS at 13:43

## 2025-08-19 RX ADMIN — ROCURONIUM BROMIDE 50 MG: 10 INJECTION, SOLUTION INTRAVENOUS at 11:53

## 2025-08-19 RX ADMIN — ONDANSETRON 4 MG: 2 INJECTION INTRAMUSCULAR; INTRAVENOUS at 12:48

## 2025-08-19 RX ADMIN — DEXAMETHASONE SODIUM PHOSPHATE 8 MG: 4 INJECTION, SOLUTION INTRAMUSCULAR; INTRAVENOUS at 11:58

## 2025-08-19 RX ADMIN — SODIUM CHLORIDE, SODIUM LACTATE, POTASSIUM CHLORIDE, AND CALCIUM CHLORIDE: .6; .31; .03; .02 INJECTION, SOLUTION INTRAVENOUS at 09:57

## 2025-08-19 RX ADMIN — MIDAZOLAM 2 MG: 1 INJECTION INTRAMUSCULAR; INTRAVENOUS at 11:47

## 2025-08-19 RX ADMIN — SUGAMMADEX 106 MG: 100 INJECTION, SOLUTION INTRAVENOUS at 12:48

## 2025-08-19 RX ADMIN — PROPOFOL 200 MG: 10 INJECTION, EMULSION INTRAVENOUS at 11:53

## 2025-08-19 RX ADMIN — HYDROMORPHONE HYDROCHLORIDE 0.5 MG: 1 INJECTION, SOLUTION INTRAMUSCULAR; INTRAVENOUS; SUBCUTANEOUS at 12:25

## 2025-08-19 RX ADMIN — HYDROMORPHONE HYDROCHLORIDE 0.5 MG: 1 INJECTION, SOLUTION INTRAMUSCULAR; INTRAVENOUS; SUBCUTANEOUS at 14:39

## 2025-08-19 RX ADMIN — KETOROLAC TROMETHAMINE 30 MG: 30 INJECTION, SOLUTION INTRAMUSCULAR; INTRAVENOUS at 14:24

## 2025-08-19 RX ADMIN — DIAZEPAM 2.5 MG: 5 INJECTION, SOLUTION INTRAMUSCULAR; INTRAVENOUS at 14:14

## 2025-08-19 ASSESSMENT — PAIN DESCRIPTION - ORIENTATION
ORIENTATION: OTHER (COMMENT)

## 2025-08-19 ASSESSMENT — PAIN DESCRIPTION - FREQUENCY
FREQUENCY: CONTINUOUS

## 2025-08-19 ASSESSMENT — PAIN - FUNCTIONAL ASSESSMENT
PAIN_FUNCTIONAL_ASSESSMENT: 0-10
PAIN_FUNCTIONAL_ASSESSMENT: ACTIVITIES ARE NOT PREVENTED
PAIN_FUNCTIONAL_ASSESSMENT: PREVENTS OR INTERFERES SOME ACTIVE ACTIVITIES AND ADLS
PAIN_FUNCTIONAL_ASSESSMENT: 0-10
PAIN_FUNCTIONAL_ASSESSMENT: PREVENTS OR INTERFERES SOME ACTIVE ACTIVITIES AND ADLS
PAIN_FUNCTIONAL_ASSESSMENT: 0-10
PAIN_FUNCTIONAL_ASSESSMENT: PREVENTS OR INTERFERES SOME ACTIVE ACTIVITIES AND ADLS
PAIN_FUNCTIONAL_ASSESSMENT: 0-10
PAIN_FUNCTIONAL_ASSESSMENT: 0-10
PAIN_FUNCTIONAL_ASSESSMENT: PREVENTS OR INTERFERES SOME ACTIVE ACTIVITIES AND ADLS

## 2025-08-19 ASSESSMENT — PAIN DESCRIPTION - LOCATION
LOCATION: ABDOMEN

## 2025-08-19 ASSESSMENT — PAIN DESCRIPTION - ONSET
ONSET: ON-GOING

## 2025-08-19 ASSESSMENT — PAIN DESCRIPTION - PAIN TYPE
TYPE: SURGICAL PAIN

## 2025-08-19 ASSESSMENT — PAIN SCALES - GENERAL
PAINLEVEL_OUTOF10: 6
PAINLEVEL_OUTOF10: 6
PAINLEVEL_OUTOF10: 7
PAINLEVEL_OUTOF10: 1
PAINLEVEL_OUTOF10: 7
PAINLEVEL_OUTOF10: 6

## 2025-08-19 ASSESSMENT — PAIN DESCRIPTION - DESCRIPTORS
DESCRIPTORS: ACHING;SORE
DESCRIPTORS: SORE
DESCRIPTORS: ACHING;SORE
DESCRIPTORS: ACHING;SORE
DESCRIPTORS: ACHING
DESCRIPTORS: SORE
DESCRIPTORS: CRAMPING
DESCRIPTORS: ACHING;SORE

## 2025-08-21 PROBLEM — N83.202 LEFT OVARIAN CYST: Status: ACTIVE | Noted: 2025-08-21

## 2025-08-21 LAB — SURGICAL PATHOLOGY REPORT: NORMAL

## 2025-09-04 ENCOUNTER — OFFICE VISIT (OUTPATIENT)
Dept: OBGYN | Age: 25
End: 2025-09-04

## 2025-09-04 VITALS
SYSTOLIC BLOOD PRESSURE: 127 MMHG | DIASTOLIC BLOOD PRESSURE: 65 MMHG | HEIGHT: 64 IN | WEIGHT: 117 LBS | BODY MASS INDEX: 19.97 KG/M2 | HEART RATE: 83 BPM

## 2025-09-04 DIAGNOSIS — Z09 POSTOPERATIVE EXAMINATION: Primary | ICD-10-CM

## 2025-09-04 PROCEDURE — 99024 POSTOP FOLLOW-UP VISIT: CPT | Performed by: OBSTETRICS & GYNECOLOGY

## (undated) DEVICE — GLOVE SURG SZ 65 CRM LTX FREE POLYISOPRENE POLYMER BEAD ANTI

## (undated) DEVICE — 2, DISPOSABLE SUCTION/IRRIGATOR WITH DISPOSABLE TIP: Brand: STRYKEFLOW

## (undated) DEVICE — ADHESIVE SKIN CLOSURE TOP 0.8 CC PREM PUR LIQUIBAND RAPID LF

## (undated) DEVICE — SYRINGE MED 10ML LUERLOCK TIP W/O SFTY DISP

## (undated) DEVICE — PACK,BASIC,SIRUS,V: Brand: MEDLINE

## (undated) DEVICE — CATHETER,URETHRAL,VINYL,MALE,16",16 FR: Brand: MEDLINE

## (undated) DEVICE — DRAPE,ARM,LASER,STERILE,7" X 96": Brand: MEDLINE

## (undated) DEVICE — TROCAR: Brand: KII FIOS FIRST ENTRY

## (undated) DEVICE — ELECTRODE ES AD CRDLSS PT RET REM POLYHESIVE

## (undated) DEVICE — TROCAR LAP L100MM DIA5MM ABD Z-THREAD 1ST ENTRY KII FIOS

## (undated) DEVICE — SEALER VES DIA8MM EXT ENDOWRIST

## (undated) DEVICE — GOWN,ECLIPSE,POLYRNF,BRTHSLV,L,30/CS: Brand: MEDLINE

## (undated) DEVICE — GAUZE,SPONGE,4"X4",16PLY,XRAY,STRL,LF: Brand: MEDLINE

## (undated) DEVICE — SYRINGE MED 20ML STD CLR PLAS LUERLOCK TIP N CTRL DISP

## (undated) DEVICE — KIT,ANTI FOG,W/SPONGE & FLUID,SOFT PACK: Brand: MEDLINE

## (undated) DEVICE — NEEDLE HYPO 23GA L1.5IN TURQ POLYPR HUB S STL THN WALL IM

## (undated) DEVICE — GLOVE SURG SZ 65 THK91MIL LTX FREE SYN POLYISOPRENE

## (undated) DEVICE — SUTURE VICRYL SZ 4-0 L18IN ABSRB UD L19MM PS-2 3/8 CIR PRIM J496H

## (undated) DEVICE — DRAPE EQUIP ROBOTIC CLMN

## (undated) DEVICE — GLOVE ORANGE PI 8   MSG9080

## (undated) DEVICE — PREMIUM DRY TRAY LF: Brand: MEDLINE INDUSTRIES, INC.

## (undated) DEVICE — TROCAR: Brand: KII® SLEEVE

## (undated) DEVICE — SYRINGE 20ML LL S/C 50

## (undated) DEVICE — Device

## (undated) DEVICE — TOWEL SURG W17XL27IN BLU COT STD PREWASHED STERILE 6 PER PK

## (undated) DEVICE — MATERNITY PAD,HEAVY: Brand: CURITY

## (undated) DEVICE — DRAPE ARM W21XH19XL10.5IN DA VINCI XI INTUITIVE SURGICAL

## (undated) DEVICE — SUTURE MONOCRYL SZ 4-0 L18IN ABSRB UD L19MM PS-2 3/8 CIR PRIM Y496G

## (undated) DEVICE — Z INACTIVE COVER MAYO STAND CLTH

## (undated) DEVICE — SEALER ENDOSCP L37CM NANO COAT BLNT TIP LAP DIV

## (undated) DEVICE — GLOVE ORANGE PI 7   MSG9070

## (undated) DEVICE — COUNTER NDL 30 COUNT FOAM STRP SGL MAG

## (undated) DEVICE — GLOVE ORANGE PI 7 1/2   MSG9075

## (undated) DEVICE — TIP COVER ACCESSORY

## (undated) DEVICE — APPLICATOR MEDICATED 26 CC SOLUTION HI LT ORNG CHLORAPREP

## (undated) DEVICE — SOLUTION SCRB 4OZ 4% CHG H2O AIDED FOR PREOPERATIVE SKIN

## (undated) DEVICE — LINER,SEMI-RIGID,3000CC,50EA/CS: Brand: MEDLINE

## (undated) DEVICE — GLOVE SURG SZ 6 THK91MIL LTX FREE SYN POLYISOPRENE ANTI

## (undated) DEVICE — BANDAGE ADH W1XL3IN NAT FAB WVN FLX DURABLE N ADH PD SEAL

## (undated) DEVICE — TISSUE RETRIEVAL SYSTEM: Brand: INZII RETRIEVAL SYSTEM

## (undated) DEVICE — SOLUTION IV 1000ML 0.9% SOD CHL FOR IRRIG PLAS CONT

## (undated) DEVICE — BASIC PACK: Brand: CONVERTORS

## (undated) DEVICE — Z INACTIVE NO ACTIVE SUPPLIER APPLICATOR MEDICATED 26 CC TINT HI-LITE ORNG STRL CHLORAPREP

## (undated) DEVICE — SEAL

## (undated) DEVICE — INTENDED FOR TISSUE SEPARATION, AND OTHER PROCEDURES THAT REQUIRE A SHARP SURGICAL BLADE TO PUNCTURE OR CUT.: Brand: BARD-PARKER ® STAINLESS STEEL BLADES

## (undated) DEVICE — NEEDLE 20GAX1.5 HYPO SHRT BVL

## (undated) DEVICE — SYRINGE MED 60ML PLAS ECC TIP N CTRL DISP

## (undated) DEVICE — DRAPE EQUIP CLMN ROBOTIC DISP DA VINCI XI

## (undated) DEVICE — TOWEL,OR,DSP,ST,WHITE,DLX,XR,4/PK,20PK/C: Brand: MEDLINE

## (undated) DEVICE — TOWEL,OR,DSP,ST,BLUE,STD,6/PK,12PK/CS: Brand: MEDLINE

## (undated) DEVICE — BAG SPEC REM 224ML W4XL6IN DIA10MM 1 HND GYN DISP ENDOPCH

## (undated) DEVICE — OBTURATOR ROBOTIC DIA8MM BLDELSS ENDOSCP DISP DA VINCI SI

## (undated) DEVICE — TUBING INSUFFLATOR HEAT HI FLO SET PNEUMOCLEAR

## (undated) DEVICE — PUMP SUC IRR TBNG L10FT W/ HNDPC ASSEMB STRYKEFLOW 2

## (undated) DEVICE — 40586 ADVANCED TRENDELENBURG POSITIONING KIT: Brand: 40586 ADVANCED TRENDELENBURG POSITIONING KIT

## (undated) DEVICE — GLOVE SURG SZ 6 CRM LTX FREE POLYISOPRENE POLYMER BEAD ANTI

## (undated) DEVICE — Z DISCONTINUED USE 2764362 SEAL ENDOSCP INSTR DIA5-8MM UNIV FOR CANN DA VINCI XI

## (undated) DEVICE — SEAL UNIVERSAL 5-12MM

## (undated) DEVICE — KIT ANTIFOG SOL 6GM IPA DISP FOR ENDOSCP PROC FRED DEXIDE

## (undated) DEVICE — MARYLAND BIPOLAR FORCEPS: Brand: ENDOWRIST

## (undated) DEVICE — TRAY PREP DRY W/ PREM GLV 2 APPL 6 SPNG 2 UNDPD 1 OVERWRAP

## (undated) DEVICE — MARKER SURG SKIN UTIL REGULAR/FINE 2 TIP W/ RUL AND 9 LBL

## (undated) DEVICE — Z INACTIVE USE 2863041 SPONGE GZ W4XL4IN 100% COT 16 PLY RADPQ HIGHLY ABSRB

## (undated) DEVICE — DRAPE, LAVH, STERILE: Brand: MEDLINE

## (undated) DEVICE — TBG INSUFFLATION W/PUSH ON CON: Brand: MEDLINE INDUSTRIES, INC.

## (undated) DEVICE — INSUFFLATION NEEDLE TO ESTABLISH PNEUMOPERITONEUM.: Brand: INSUFFLATION NEEDLE

## (undated) DEVICE — COVER TIP DIA8MM DA VINCI SI XI X ENDOWRIST